# Patient Record
Sex: FEMALE | Race: BLACK OR AFRICAN AMERICAN | NOT HISPANIC OR LATINO | URBAN - METROPOLITAN AREA
[De-identification: names, ages, dates, MRNs, and addresses within clinical notes are randomized per-mention and may not be internally consistent; named-entity substitution may affect disease eponyms.]

---

## 2021-08-31 ENCOUNTER — EMERGENCY (EMERGENCY)
Facility: HOSPITAL | Age: 71
LOS: 1 days | Discharge: HOME | End: 2021-08-31
Attending: EMERGENCY MEDICINE
Payer: MEDICARE

## 2021-08-31 VITALS
DIASTOLIC BLOOD PRESSURE: 75 MMHG | OXYGEN SATURATION: 97 % | HEIGHT: 62 IN | WEIGHT: 293 LBS | RESPIRATION RATE: 18 BRPM | HEART RATE: 75 BPM | TEMPERATURE: 98 F | SYSTOLIC BLOOD PRESSURE: 129 MMHG

## 2021-08-31 LAB
ALBUMIN SERPL ELPH-MCNC: 4.2 G/DL — SIGNIFICANT CHANGE UP (ref 3.3–5.2)
ALP SERPL-CCNC: 144 U/L — HIGH (ref 40–120)
ALT FLD-CCNC: 13 U/L — SIGNIFICANT CHANGE UP
ANION GAP SERPL CALC-SCNC: 14 MMOL/L — SIGNIFICANT CHANGE UP (ref 5–17)
AST SERPL-CCNC: 22 U/L — SIGNIFICANT CHANGE UP
BASOPHILS # BLD AUTO: 0.05 K/UL — SIGNIFICANT CHANGE UP (ref 0–0.2)
BASOPHILS NFR BLD AUTO: 0.7 % — SIGNIFICANT CHANGE UP (ref 0–2)
BILIRUB SERPL-MCNC: <0.2 MG/DL — LOW (ref 0.4–2)
BUN SERPL-MCNC: 25.6 MG/DL — HIGH (ref 8–20)
CALCIUM SERPL-MCNC: 9.9 MG/DL — SIGNIFICANT CHANGE UP (ref 8.6–10.2)
CHLORIDE SERPL-SCNC: 105 MMOL/L — SIGNIFICANT CHANGE UP (ref 98–107)
CO2 SERPL-SCNC: 21 MMOL/L — LOW (ref 22–29)
CREAT SERPL-MCNC: 0.99 MG/DL — SIGNIFICANT CHANGE UP (ref 0.5–1.3)
EOSINOPHIL # BLD AUTO: 0.74 K/UL — HIGH (ref 0–0.5)
EOSINOPHIL NFR BLD AUTO: 10.9 % — HIGH (ref 0–6)
GLUCOSE SERPL-MCNC: 83 MG/DL — SIGNIFICANT CHANGE UP (ref 70–99)
HCT VFR BLD CALC: 44.4 % — SIGNIFICANT CHANGE UP (ref 34.5–45)
HGB BLD-MCNC: 14 G/DL — SIGNIFICANT CHANGE UP (ref 11.5–15.5)
IMM GRANULOCYTES NFR BLD AUTO: 0.1 % — SIGNIFICANT CHANGE UP (ref 0–1.5)
LYMPHOCYTES # BLD AUTO: 2.84 K/UL — SIGNIFICANT CHANGE UP (ref 1–3.3)
LYMPHOCYTES # BLD AUTO: 41.8 % — SIGNIFICANT CHANGE UP (ref 13–44)
MCHC RBC-ENTMCNC: 28.6 PG — SIGNIFICANT CHANGE UP (ref 27–34)
MCHC RBC-ENTMCNC: 31.5 GM/DL — LOW (ref 32–36)
MCV RBC AUTO: 90.8 FL — SIGNIFICANT CHANGE UP (ref 80–100)
MONOCYTES # BLD AUTO: 0.51 K/UL — SIGNIFICANT CHANGE UP (ref 0–0.9)
MONOCYTES NFR BLD AUTO: 7.5 % — SIGNIFICANT CHANGE UP (ref 2–14)
NEUTROPHILS # BLD AUTO: 2.65 K/UL — SIGNIFICANT CHANGE UP (ref 1.8–7.4)
NEUTROPHILS NFR BLD AUTO: 39 % — LOW (ref 43–77)
PLATELET # BLD AUTO: 291 K/UL — SIGNIFICANT CHANGE UP (ref 150–400)
POTASSIUM SERPL-MCNC: 4.7 MMOL/L — SIGNIFICANT CHANGE UP (ref 3.5–5.3)
POTASSIUM SERPL-SCNC: 4.7 MMOL/L — SIGNIFICANT CHANGE UP (ref 3.5–5.3)
PROT SERPL-MCNC: 8.9 G/DL — HIGH (ref 6.6–8.7)
RBC # BLD: 4.89 M/UL — SIGNIFICANT CHANGE UP (ref 3.8–5.2)
RBC # FLD: 15.2 % — HIGH (ref 10.3–14.5)
SODIUM SERPL-SCNC: 140 MMOL/L — SIGNIFICANT CHANGE UP (ref 135–145)
WBC # BLD: 6.8 K/UL — SIGNIFICANT CHANGE UP (ref 3.8–10.5)
WBC # FLD AUTO: 6.8 K/UL — SIGNIFICANT CHANGE UP (ref 3.8–10.5)

## 2021-08-31 PROCEDURE — 73700 CT LOWER EXTREMITY W/O DYE: CPT | Mod: 26,RT,ME

## 2021-08-31 PROCEDURE — G1004: CPT

## 2021-08-31 PROCEDURE — 73562 X-RAY EXAM OF KNEE 3: CPT | Mod: 26,RT

## 2021-08-31 PROCEDURE — 99218: CPT

## 2021-08-31 RX ORDER — LOSARTAN POTASSIUM 100 MG/1
100 TABLET, FILM COATED ORAL DAILY
Refills: 0 | Status: DISCONTINUED | OUTPATIENT
Start: 2021-08-31 | End: 2021-09-05

## 2021-08-31 RX ORDER — OXYCODONE AND ACETAMINOPHEN 5; 325 MG/1; MG/1
1 TABLET ORAL ONCE
Refills: 0 | Status: DISCONTINUED | OUTPATIENT
Start: 2021-08-31 | End: 2021-08-31

## 2021-08-31 RX ORDER — ACETAMINOPHEN 500 MG
650 TABLET ORAL ONCE
Refills: 0 | Status: COMPLETED | OUTPATIENT
Start: 2021-08-31 | End: 2021-08-31

## 2021-08-31 RX ORDER — LORATADINE 10 MG/1
10 TABLET ORAL DAILY
Refills: 0 | Status: DISCONTINUED | OUTPATIENT
Start: 2021-08-31 | End: 2021-09-05

## 2021-08-31 RX ORDER — METOPROLOL TARTRATE 50 MG
100 TABLET ORAL DAILY
Refills: 0 | Status: DISCONTINUED | OUTPATIENT
Start: 2021-08-31 | End: 2021-09-05

## 2021-08-31 RX ORDER — CITALOPRAM 10 MG/1
10 TABLET, FILM COATED ORAL DAILY
Refills: 0 | Status: DISCONTINUED | OUTPATIENT
Start: 2021-08-31 | End: 2021-09-05

## 2021-08-31 RX ORDER — KETOROLAC TROMETHAMINE 30 MG/ML
10 SYRINGE (ML) INJECTION EVERY 6 HOURS
Refills: 0 | Status: COMPLETED | OUTPATIENT
Start: 2021-08-31 | End: 2021-09-05

## 2021-08-31 RX ORDER — ACETAMINOPHEN 500 MG
650 TABLET ORAL EVERY 6 HOURS
Refills: 0 | Status: DISCONTINUED | OUTPATIENT
Start: 2021-08-31 | End: 2021-08-31

## 2021-08-31 RX ORDER — LANOLIN ALCOHOL/MO/W.PET/CERES
5 CREAM (GRAM) TOPICAL AT BEDTIME
Refills: 0 | Status: DISCONTINUED | OUTPATIENT
Start: 2021-08-31 | End: 2021-09-05

## 2021-08-31 RX ORDER — POTASSIUM CHLORIDE 20 MEQ
10 PACKET (EA) ORAL DAILY
Refills: 0 | Status: DISCONTINUED | OUTPATIENT
Start: 2021-08-31 | End: 2021-09-05

## 2021-08-31 RX ORDER — SENNA PLUS 8.6 MG/1
2 TABLET ORAL AT BEDTIME
Refills: 0 | Status: DISCONTINUED | OUTPATIENT
Start: 2021-08-31 | End: 2021-09-05

## 2021-08-31 RX ORDER — OXYCODONE HYDROCHLORIDE 5 MG/1
5 TABLET ORAL EVERY 8 HOURS
Refills: 0 | Status: DISCONTINUED | OUTPATIENT
Start: 2021-08-31 | End: 2021-08-31

## 2021-08-31 RX ORDER — KETOROLAC TROMETHAMINE 30 MG/ML
15 SYRINGE (ML) INJECTION EVERY 8 HOURS
Refills: 0 | Status: DISCONTINUED | OUTPATIENT
Start: 2021-08-31 | End: 2021-08-31

## 2021-08-31 RX ORDER — KETOROLAC TROMETHAMINE 30 MG/ML
30 SYRINGE (ML) INJECTION ONCE
Refills: 0 | Status: DISCONTINUED | OUTPATIENT
Start: 2021-08-31 | End: 2021-08-31

## 2021-08-31 RX ORDER — ACETAMINOPHEN 500 MG
975 TABLET ORAL ONCE
Refills: 0 | Status: COMPLETED | OUTPATIENT
Start: 2021-08-31 | End: 2021-08-31

## 2021-08-31 RX ADMIN — Medication 975 MILLIGRAM(S): at 17:01

## 2021-08-31 RX ADMIN — Medication 30 MILLIGRAM(S): at 17:01

## 2021-08-31 NOTE — ED CDU PROVIDER INITIAL DAY NOTE - MEDICAL DECISION MAKING DETAILS
morbidly obese female with acute on chronic right knee pain atraumatic no secondary signs of infection pain with active and passive ROM. + severe/advanced arthritic degernation of knee. pain limiting ROM and ability to ambulate. placed in observation for PT and pain control. pt open to [facility]

## 2021-08-31 NOTE — ED PROVIDER NOTE - PROGRESS NOTE DETAILS
pty still is in a lot of pain can not walk . place on obs for Am PT and re-eval added the ct of the right knee

## 2021-08-31 NOTE — ED CDU PROVIDER INITIAL DAY NOTE - OBJECTIVE STATEMENT
72 yo female obese PMhx of HTn , depression S.p left knee TKR 2019  G[facility] presents in Er and c.o right knee pain started about 1 week ago . states  she had injection done at orthopedic x 8-9 days ago. pain 10/10 at the medial aspect of the knee worse by walking or bending the right knee w/o radiating . denies any fall or trauma r twisting knee . states she is taking tylenol 650mg and naproxen 500mg  bid without significant relief . last pain med was last night and was tylenol . denies any fever or chills or redness . pt states due to venous stasis she had us venous few month ago and was negative. pt states she use walker to walk at home but due to the pain that she has been experiencing has only been sitting and rolling with the walker

## 2021-08-31 NOTE — ED PROVIDER NOTE - CLINICAL SUMMARY MEDICAL DECISION MAKING FREE TEXT BOX
right knee pain most likely due to OA  S.p left knee TKR . I believed pain is not control well . pt is requesting to f.u ortho at [facility]   toradol 30 IM , percocet 5mg Po . Xray of the right knee . f.u ortho

## 2021-08-31 NOTE — ED PROVIDER NOTE - PHYSICAL EXAMINATION
Const: AOX3 nontoxic appearing, no apparent respiratory or physical distress. use wheelchair obese female   HEENT: NC/AT. Moist mucous membranes.  Eyes: BARBARA. EOMI  Neck: Soft and supple. Full ROM without pain.  Cardiac: Regular rate and regular rhythm.  Resp: Speaking in full sentences. No evidence of respiratory distress.   MSk: right knee: no gross deformity of extremity, active and passive with pain , , (-) effusion, (-) ballottement,  (-) tenderness patella, medial aspect of the knee TTP , negative ligamentous stability difficult to assess secondary to pain on stressing but no gross instability noted. . no erythema or ecchymosis noted   left knee mid line scar noted   Skin: No rashes, abrasions or lacerations.  Lymph: No cervical lymphadenopathy.  Neuro: Awake, alert & oriented x 3. Moves all extremities symmetrically.

## 2021-08-31 NOTE — ED PROVIDER NOTE - OBJECTIVE STATEMENT
72 yo female PMh of HTn , depression on medication S.p left knee TKR 2019 presents in Er and c.o left knee pain started about 1 week ago . states  she had injection done at orthopedic x 8-9 days ago. pain 10/10 at the medial aspect of the knee worse by walking or bending the right knee w.o radiating . denies any fall or trauma r twisting knee . states she is taking tylenol 650mg and naproxen 500mg  bid . states she d.c the naproxen due to was not helping her . last pain med was last night and was tylenol . denies any fever or chills or redness . pt states due to venous stasis she haad us venous few month ago and was negative. pt states she use walker to walk at home

## 2021-08-31 NOTE — ED CDU PROVIDER INITIAL DAY NOTE - PHYSICAL EXAMINATION
Const: AOX3 nontoxic appearing, no apparent respiratory or physical distress. use wheelchair obese female   HEENT: NC/AT. Moist mucous membranes.  Eyes: BARBARA.   Cardiac: Regular rate and regular rhythm.  Resp: Speaking in full sentences. No evidence of respiratory distress.   MSk: right knee: obese.  no gross deformity of extremity, active and passive with pain , no palpable effusion TTP over medial/ anterior patella no overlying erytehma ecchymosis + straight leg raise    left knee mid line scar noted FROM   no calf tenderness 2+ pulses sensation intact   Skin: No rashes, abrasions or lacerations.  Neuro: Awake, alert & oriented x 3.

## 2021-08-31 NOTE — ED PROVIDER NOTE - ATTENDING CONTRIBUTION TO CARE
I, Maria Isabel Domingo, performed a face to face bedside interview with this patient regarding history of present illness, review of symptoms and relevant past medical, social and family history.  I completed an independent physical examination. Medical decision making, follow-up on ordered tests (ie labs, radiologic studies) and re-evaluation of the patient's status has been communicated to the ACP.  Disposition of the patient will be based on test outcome and response to ED interventions.     Pt with atraumatic severe rt knee pain, no fevers. pain worse medial with movemetn and touch. has severe OA. had lubricating injections last week.     morbidly obese   +ttp rt medial joint line with pain with movement     likely arthritis or meniscal injury. xray/pain control. if needed and pain still severe will get ct to r/o tibial plateau fracture. possible PT/Obs if needed

## 2021-08-31 NOTE — ED ADULT NURSE NOTE - OBJECTIVE STATEMENT
Pt with chronic right knee pain that has worsened over the last 4 days. No injury. Pain is worse with weight bearing.

## 2021-09-01 VITALS
DIASTOLIC BLOOD PRESSURE: 75 MMHG | HEART RATE: 62 BPM | TEMPERATURE: 98 F | SYSTOLIC BLOOD PRESSURE: 157 MMHG | OXYGEN SATURATION: 99 % | RESPIRATION RATE: 18 BRPM

## 2021-09-01 LAB — SARS-COV-2 RNA SPEC QL NAA+PROBE: SIGNIFICANT CHANGE UP

## 2021-09-01 PROCEDURE — U0003: CPT

## 2021-09-01 PROCEDURE — 73562 X-RAY EXAM OF KNEE 3: CPT

## 2021-09-01 PROCEDURE — 85025 COMPLETE CBC W/AUTO DIFF WBC: CPT

## 2021-09-01 PROCEDURE — U0005: CPT

## 2021-09-01 PROCEDURE — 80053 COMPREHEN METABOLIC PANEL: CPT

## 2021-09-01 PROCEDURE — G1004: CPT

## 2021-09-01 PROCEDURE — 99283 EMERGENCY DEPT VISIT LOW MDM: CPT | Mod: 25

## 2021-09-01 PROCEDURE — G0378: CPT

## 2021-09-01 PROCEDURE — 99217: CPT

## 2021-09-01 PROCEDURE — 36415 COLL VENOUS BLD VENIPUNCTURE: CPT

## 2021-09-01 PROCEDURE — 73700 CT LOWER EXTREMITY W/O DYE: CPT | Mod: ME

## 2021-09-01 PROCEDURE — 96372 THER/PROPH/DIAG INJ SC/IM: CPT

## 2021-09-01 RX ORDER — OXYCODONE HYDROCHLORIDE 5 MG/1
1 TABLET ORAL
Qty: 9 | Refills: 0
Start: 2021-09-01 | End: 2021-09-03

## 2021-09-01 RX ORDER — ACETAMINOPHEN 500 MG
975 TABLET ORAL ONCE
Refills: 0 | Status: COMPLETED | OUTPATIENT
Start: 2021-09-01 | End: 2021-09-01

## 2021-09-01 RX ADMIN — Medication 650 MILLIGRAM(S): at 00:15

## 2021-09-01 RX ADMIN — LORATADINE 10 MILLIGRAM(S): 10 TABLET ORAL at 11:06

## 2021-09-01 RX ADMIN — Medication 10 MILLIGRAM(S): at 00:15

## 2021-09-01 RX ADMIN — CITALOPRAM 10 MILLIGRAM(S): 10 TABLET, FILM COATED ORAL at 00:15

## 2021-09-01 RX ADMIN — Medication 10 MILLIGRAM(S): at 13:22

## 2021-09-01 RX ADMIN — Medication 10 MILLIGRAM(S): at 00:45

## 2021-09-01 RX ADMIN — Medication 975 MILLIGRAM(S): at 11:06

## 2021-09-01 RX ADMIN — SENNA PLUS 2 TABLET(S): 8.6 TABLET ORAL at 00:14

## 2021-09-01 RX ADMIN — Medication 650 MILLIGRAM(S): at 00:45

## 2021-09-01 RX ADMIN — Medication 5 MILLIGRAM(S): at 00:14

## 2021-09-01 RX ADMIN — Medication 975 MILLIGRAM(S): at 13:22

## 2021-09-01 RX ADMIN — Medication 10 MILLIGRAM(S): at 11:15

## 2021-09-01 RX ADMIN — CITALOPRAM 10 MILLIGRAM(S): 10 TABLET, FILM COATED ORAL at 11:06

## 2021-09-01 RX ADMIN — Medication 100 MILLIGRAM(S): at 05:54

## 2021-09-01 RX ADMIN — Medication 10 MILLIGRAM(S): at 05:54

## 2021-09-01 RX ADMIN — Medication 10 MILLIEQUIVALENT(S): at 11:06

## 2021-09-01 RX ADMIN — LOSARTAN POTASSIUM 100 MILLIGRAM(S): 100 TABLET, FILM COATED ORAL at 00:19

## 2021-09-01 NOTE — ED CDU PROVIDER SUBSEQUENT DAY NOTE - ATTENDING CONTRIBUTION TO CARE
Vivi KERR- 72 Y/O F with h/o left knee replacement p/w worsening rt knee pain. Pt is due to rt knee replacement but pain got worse, no fall or fever. Pt uses rollator at home.     Pt is alert,  obese, well appearing female, sitting comfortably in wheel chair and was able to use restroom with wheel chair help, s1s2 normal reg, b/l clear breath sounds, abd soft, nt, nd, neuro exam aox3, cn 2-12 intact, peerl eomi, skin warm, dry, good turgor    plan to get PT and social work eval, control pain, pt wants new ortho for rt knee replacement, given recommendations and will follow up for surgery

## 2021-09-01 NOTE — ED ADULT NURSE REASSESSMENT NOTE - NS ED NURSE REASSESS COMMENT FT1
Assumed care of patient from previous RN.  Patient A&O in wheel chair.  Patient c/o right knee pain, unable to ambulate due to pain, denies any recent trauma/ injury.  Patient to be seen by obs PA for pending orders.  Patient instructed on call bell for assistance for change from wheel chair to bed, pt verbalized understanding.  PT consult and possible placement.  Safety maintained.
Assumed care of the patient at 0730. Verbal report received from [facility]. Patient A&Ox4. No s/s of acute distres.s States Right knee pain persists. Denies any numbness or tingling. Patient pending PT eval and SW consult. Patient in understanding of plan of care. Patient with no further questions for the RN. Resting in comfort. Call bell within reach and encouraged to use when assistance needed. Will continue to monitor.

## 2021-09-01 NOTE — PROVIDER CONTACT NOTE (OTHER) - BACKGROUND
Pt awaiting a R TKR, scheduled for OCT, pain in R knee becomes debilitating after about 15-20mins. of activity.

## 2021-09-01 NOTE — PHYSICAL THERAPY INITIAL EVALUATION ADULT - ADDITIONAL COMMENTS
36.9 Pt lives with  in a 1 story house with ramp to enter.  able to assist.  PTA, pt able to walk short distance with RW and Modified Independent with all ADLs and self care.

## 2021-09-01 NOTE — ED CDU PROVIDER SUBSEQUENT DAY NOTE - HISTORY
PT placed in OBS, has had no acute incidents or complaints while in CDU PT is stable. PT Placed in OBS for pain control and DC planing for knee pain.

## 2021-09-01 NOTE — ED CDU PROVIDER DISPOSITION NOTE - CARE PROVIDER_API CALL
Gregory Davies (DO)  Orthopedics  [address] [place] [id number]  Phone: [phone]  Fax: [phone]  Follow Up Time:

## 2021-09-01 NOTE — ED CDU PROVIDER DISPOSITION NOTE - PATIENT PORTAL LINK FT
You can access the FollowMyHealth Patient Portal offered by [facility] by registering at the following website: [email or web address]. By joining [employer]’s FollowMyHealth portal, you will also be able to view your health information using other applications (apps) compatible with our system.

## 2021-09-01 NOTE — ED CDU PROVIDER DISPOSITION NOTE - CLINICAL COURSE
71 year old female with PMHx HTN, depression, LKR presents to the ED for R knee pain x 1 week. Denies fall, injury or trauma. Pt lives with her  and uses rolling walker at home. CT knee with osteoarthritis, no fx. CM arranged for home PT and expedited ortho appt for follow up. Will dc with Oxycodone.

## 2021-09-01 NOTE — PROVIDER CONTACT NOTE (OTHER) - ASSESSMENT
Pt with 6/10 c/o right knee pain before and after rx.   Pt is functionally at baseline level of function, and not in need of skilled PT, will no longer follow . Pt left sitting in w/c  in no apparent distress and call bell within reach.

## 2021-09-01 NOTE — ED CDU PROVIDER DISPOSITION NOTE - NSFOLLOWUPINSTRUCTIONS_ED_ALL_ED_FT
Take Tylenol every 6 hours as needed for pain   Take oxycodone 5mg every 8 hours as needed for severe pain   Follow up with primary medical doctor in 2-3 days   Follow up with orthopedist within 1 week   Return to the ED for worsening pain, numbness/ tingling, or any new or concerning symptoms Take Tylenol every 6 hours as needed for pain   Take oxycodone 5mg every 8 hours as needed for severe pain   Follow up with primary medical doctor in 2-3 days   Follow up with orthopedist within 1 week   Use rolling walker to walk  Return to the ED for worsening pain, numbness/ tingling, or any new or concerning symptoms

## 2021-09-01 NOTE — ED CDU PROVIDER DISPOSITION NOTE - ATTENDING CONTRIBUTION TO CARE
lacement but pain got worse, no fall or fever. Pt uses rollator at home.     Pt is alert,  obese, well appearing female, sitting comfortably in wheel chair and was able to use restroom with wheel chair help, s1s2 normal reg, b/l clear breath sounds, abd soft, nt, nd, neuro exam aox3, cn 2-12 intact, peerl eomi, skin warm, dry, good turgor     Sent home, arranged home PT, control pain, pt wants new ortho for rt knee replacement, given recommendations and will follow up for surgery.

## 2021-09-01 NOTE — ED ADULT NURSE REASSESSMENT NOTE - COMFORT CARE
meal provided/plan of care explained/po fluids offered/repositioned/wait time explained
meal provided/plan of care explained/repositioned/wait time explained

## 2021-09-02 PROBLEM — I10 ESSENTIAL (PRIMARY) HYPERTENSION: Chronic | Status: ACTIVE | Noted: 2021-08-31

## 2021-09-10 ENCOUNTER — APPOINTMENT (OUTPATIENT)
Dept: ORTHOPEDIC SURGERY | Facility: CLINIC | Age: 71
End: 2021-09-10
Payer: MEDICARE

## 2021-09-10 DIAGNOSIS — M17.11 UNILATERAL PRIMARY OSTEOARTHRITIS, RIGHT KNEE: ICD-10-CM

## 2021-09-10 DIAGNOSIS — E66.9 OBESITY, UNSPECIFIED: ICD-10-CM

## 2021-09-10 DIAGNOSIS — M25.561 PAIN IN RIGHT KNEE: ICD-10-CM

## 2021-09-10 PROCEDURE — 99204 OFFICE O/P NEW MOD 45 MIN: CPT

## 2021-09-10 NOTE — PHYSICAL EXAM
[de-identified] : GENERAL APPEARANCE: Well nourished and hydrated, pleasant, alert, and oriented x 3. Appears their stated age. \par HEENT: Normocephalic, extraocular eye motion intact. Nasal septum midline. Oral cavity clear. External auditory canal clear. \par RESPIRATORY: Breath sounds clear and audible in all lobes. No wheezing, No accessory muscle use.\par CARDIOVASCULAR: No apparent abnormalities. No lower leg edema. No varicosities. Pedal pulses are palpable.\par NEUROLOGIC: Sensation is normal, no muscle weakness in the upper or lower extremities.\par DERMATOLOGIC: No apparent skin lesions, moist, warm, no rash.\par SPINE: Cervical spine appears normal and moves freely; thoracic spine appears normal and moves freely; lumbosacral spine appears normal and moves freely, normal, nontender.\par MUSCULOSKELETAL: Hands, wrists, and elbows are normal and move freely, shoulders are normal and move freely. \par Psychiatric: Oriented to person, place, and time, insight and judgement were intact and the affect was normal. \par Musculoskeletal:. Right knee exam shows no effusion, ROM is 0-90 degrees, no instability, no pain with Noe, medial joint line tenderness. \par 5/5 motor strength in bilateral lower extremities. Sensory: Intact in bilateral lower extremities. DTRs: Biceps, brachioradialis, triceps, patellar, ankle and plantar 2+ and symmetric bilaterally. Pulses: dorsalis pedis, posterior tibial, femoral, popliteal, and radial 2+ and symmetric bilaterally. \par Constitutional: Alert and in no acute distress, but well-appearing. \par  [de-identified] : X-rays of the right knee brought in from outside on 8/30/2021 show no acute fractures or dislocations.  Severe tricompartmental degenerative arthritis.\par \par CT scan of the right knee from 8/30/2021 shows no acute fractures or dislocations.

## 2021-09-10 NOTE — HISTORY OF PRESENT ILLNESS
[de-identified] : Patient is a 71-year-old female history of left total knee replacement performed in 2019 complicated by a fall and MCL rupture requiring revision surgery presents with a chief complaint of severe right knee pain has been going on intermittently for many years.  Patient states that a few weeks ago she had severe pain in the right knee was had to be seen in the emergency room where CAT scan showed no acute fractures.  She of note is had multiple rounds of gel injections most recently 2 weeks ago as well as cortisone injections of the right knee.  She is changing her care from her current surgeon and finding a new orthopedist.  Denies any overt trauma to the knee.  Does complain of some buckling.  Denies any locking.

## 2021-09-10 NOTE — DISCUSSION/SUMMARY
[Medication Risks Reviewed] : Medication risks reviewed [Surgical risks reviewed] : Surgical risks reviewed [de-identified] : Patient is a 71-year-old female who presents for evaluation of her right knee pain.  She would like to discuss surgical treatment options.  However I did have a lengthy discussion with her and her daughter about the fact I do not think she is an appropriate surgical candidate at this time due to her severe obesity.  She is unable to take NSAIDs due to her medical conditions.  I therefore recommended that she take Voltaren gel and apply it to her knee and have given her prescription.  I have also given her prescription for physical therapy and a nutritionist referral.  We did have a lengthy discussion about weight loss and optimizing for surgery.  I have also given her a referral to bariatric surgery.  I given her a referral to pain management.  She is going to follow-up with me in 4 to 6 weeks.  If no improvement in her symptoms we will consider a steroid injection.  We will discuss a total knee in the future however it is imperative that she loses weight to prevent complications such as blood loss implant failure PE etc.  All questions were asked and answered.

## 2021-09-27 ENCOUNTER — APPOINTMENT (OUTPATIENT)
Dept: SURGERY | Facility: CLINIC | Age: 71
End: 2021-09-27
Payer: MEDICARE

## 2021-09-27 VITALS
SYSTOLIC BLOOD PRESSURE: 175 MMHG | RESPIRATION RATE: 16 BRPM | BODY MASS INDEX: 56.04 KG/M2 | DIASTOLIC BLOOD PRESSURE: 88 MMHG | OXYGEN SATURATION: 96 % | WEIGHT: 293 LBS | HEIGHT: 60.5 IN | HEART RATE: 85 BPM | TEMPERATURE: 97.2 F

## 2021-09-27 VITALS — DIASTOLIC BLOOD PRESSURE: 93 MMHG | SYSTOLIC BLOOD PRESSURE: 156 MMHG

## 2021-09-27 PROCEDURE — 99204 OFFICE O/P NEW MOD 45 MIN: CPT

## 2021-09-28 NOTE — HISTORY OF PRESENT ILLNESS
[de-identified] : 71F with h/o osteoarthritis of the knee, morbid obesity (BMI 60), presents today to discuss her weight loss options. She reports that she needs bilateral knee replacements for OA but that she needs to lose significant weight before she can have surgery. She has limited mobility. She is interested in bariatric surgery as a tool for durable weight loss. She reports that she has tried numerous times to lose weight through more traditional medical means, including caloric restriction, increasing her vegetable intake, and increasing activity.\par Denies GERD.\par No DM\par No tobacco or EtOH use.\par No immunosuppression or chronic NSAID use.

## 2021-09-28 NOTE — ASSESSMENT
[FreeTextEntry1] : 71F with morbid obesity, BMI 60, and osteoarthritis in need of bilateral TKA, here for initial evaluation for bariatric surgery. Based on medical history of weight loss goals, would be a good candidate for sleeve gastrectomy. Will initiate bariatric workup including labs, imaging, and dietiary/pysch/Cards/Pulm/GI evaluations.

## 2021-09-28 NOTE — REVIEW OF SYSTEMS
[Joint Pain] : joint pain [Joint Stiffness] : joint stiffness [Fever] : no fever [Chills] : no chills [Eye Pain] : no eye pain [Red Eyes] : eyes not red [Dysphagia] : no dysphagia [Loss of Hearing] : no loss of hearing [Odynophagia] : no odynophagia [Mucosal Pain] : no mucosal pain [Chest Pain] : no chest pain [Palpitations] : no palpitations [Shortness Of Breath] : no shortness of breath [Wheezing] : no wheezing [Abdominal Pain] : no abdominal pain [Vomiting] : no vomiting [Reflux/Heartburn] : no reflux/ heartburn [Hernia] : no hernia [Dysuria] : no dysuria [Incontinence] : no incontinence [Skin Rash] : no skin rash [Skin Wound] : no skin wound [Confused] : no confusion [Dizziness] : no dizziness [Suicidal] : not suicidal [Insomnia] : no insomnia [Proptosis] : no proptosis [Hot Flashes] : no hot flashes [Easy Bleeding] : no tendency for easy bleeding [Easy Bruising] : no tendency for easy bruising

## 2021-09-28 NOTE — CONSULT LETTER
[Dear  ___] : Dear  [unfilled], [Consult Letter:] : I had the pleasure of evaluating your patient, [unfilled]. [Please see my note below.] : Please see my note below. [Sincerely,] : Sincerely, [Consult Closing:] : Thank you very much for allowing me to participate in the care of this patient.  If you have any questions, please do not hesitate to contact me. [FreeTextEntry1] : She came to our center to discuss the possibility of bariatric surgery to help her lose weight in order to undergo knee replacement. I believe she would be a good candidate for sleeve gastrectomy. She is motivated and I believe she will be highly successful. I will keep you updated as she moves through the preop process. [FreeTextEntry3] : Rene Cazares MD FACS\par Director, Minimally-Invasive Surgery\par [facility]\par [phone]

## 2021-11-02 ENCOUNTER — APPOINTMENT (OUTPATIENT)
Dept: SURGERY | Facility: CLINIC | Age: 71
End: 2021-11-02
Payer: MEDICARE

## 2021-11-02 VITALS
SYSTOLIC BLOOD PRESSURE: 173 MMHG | HEIGHT: 60.5 IN | DIASTOLIC BLOOD PRESSURE: 93 MMHG | WEIGHT: 293 LBS | OXYGEN SATURATION: 97 % | RESPIRATION RATE: 15 BRPM | BODY MASS INDEX: 56.04 KG/M2 | HEART RATE: 85 BPM | TEMPERATURE: 97.8 F

## 2021-11-02 PROCEDURE — 99213 OFFICE O/P EST LOW 20 MIN: CPT

## 2021-11-13 ENCOUNTER — APPOINTMENT (OUTPATIENT)
Dept: DISASTER EMERGENCY | Facility: CLINIC | Age: 71
End: 2021-11-13

## 2021-11-14 ENCOUNTER — APPOINTMENT (OUTPATIENT)
Dept: DISASTER EMERGENCY | Facility: CLINIC | Age: 71
End: 2021-11-14

## 2021-11-14 LAB — SARS-COV-2 N GENE NPH QL NAA+PROBE: NOT DETECTED

## 2021-11-15 ENCOUNTER — TRANSCRIPTION ENCOUNTER (OUTPATIENT)
Age: 71
End: 2021-11-15

## 2021-11-16 ENCOUNTER — OUTPATIENT (OUTPATIENT)
Dept: OUTPATIENT SERVICES | Facility: HOSPITAL | Age: 71
LOS: 1 days | End: 2021-11-16
Payer: MEDICARE

## 2021-11-16 DIAGNOSIS — M25.561 PAIN IN RIGHT KNEE: ICD-10-CM

## 2021-11-16 PROCEDURE — 76000 FLUOROSCOPY <1 HR PHYS/QHP: CPT

## 2021-11-16 PROCEDURE — 20610 DRAIN/INJ JOINT/BURSA W/O US: CPT | Mod: RT

## 2021-11-16 NOTE — PROGRESS NOTE ADULT - SUBJECTIVE AND OBJECTIVE BOX
Indication: Knee Pain, Right - M25.561  Planned Interventional Pain Procedure: right knee genicular nerve block  Interval: single procedure  Anesthesia: local and IV sedation  Estimated Time: 30 minutes  Recovery Facility: [facility] endo suite  Facility Estimated Length of Stay: 0 days.  Indication(s): knee pain  The following anti-inflammatory medications were used for 3 months or more and failed to alleviate  symptoms: anti-inflammatory medication.  The following analgesic medications were used for 3 months or more and failed to alleviate  symptoms: analgesics and Tylenol.  The following treatments were used for 3 months or more and failed to alleviate symptoms: home  exercises, use of cane or walker, and physical therapy.  Verified that patient has no active infection, no systemic bacteremia, no skin infections, no  neuropathic arthritis, no progressive neurological disease, and no other severe medical conditions  that outweigh benefits of procedure.  Guidance Required: Fluoroscopy  OR Equipment Required: C-ARM.

## 2021-11-16 NOTE — BRIEF OPERATIVE NOTE - OPERATION/FINDINGS
In the lateral fluoroscopic view, the right SUPERIOR MEDIAL GENICULAR NERVE was located at the midpoint of the femur at the superior medial epicondyle.  The 25 gauge 3.5 inch needle was advanced slowly and carefully to the epicondyle. Placement was confirmed with multiple fluoroscopic views. Aspiration was negative for blood or other substances. The above was repeated for the right SUPERIOR LATERAL GENICULAR NERVE located at the midpoint of the femur at the superior lateral epicondyle and the right INFERIOR MEDIAL GENICULAR NERVE located at the midpoint of the tibia at the inferior medial epicondyle.   A total of 15cc bupivicaine 0.25% with decadron 10mg was injected in divided doses

## 2021-11-16 NOTE — PROGRESS NOTE ADULT - SUBJECTIVE AND OBJECTIVE BOX
s/p R knee genicular nerve block  endorses 100% pain relief  denies any weakness  was able to ambulate without assistance  clear for discharge  will follow up in office in 2 weeks

## 2021-12-01 ENCOUNTER — APPOINTMENT (OUTPATIENT)
Dept: BARIATRICS/WEIGHT MGMT | Facility: CLINIC | Age: 71
End: 2021-12-01

## 2021-12-07 ENCOUNTER — APPOINTMENT (OUTPATIENT)
Dept: SURGERY | Facility: CLINIC | Age: 71
End: 2021-12-07
Payer: MEDICARE

## 2021-12-07 VITALS
TEMPERATURE: 97.1 F | BODY MASS INDEX: 56.04 KG/M2 | DIASTOLIC BLOOD PRESSURE: 90 MMHG | OXYGEN SATURATION: 97 % | SYSTOLIC BLOOD PRESSURE: 170 MMHG | HEART RATE: 76 BPM | WEIGHT: 293 LBS | HEIGHT: 60.5 IN | RESPIRATION RATE: 16 BRPM

## 2021-12-07 VITALS — SYSTOLIC BLOOD PRESSURE: 161 MMHG | DIASTOLIC BLOOD PRESSURE: 87 MMHG

## 2021-12-07 DIAGNOSIS — R68.82 DECREASED LIBIDO: ICD-10-CM

## 2021-12-07 DIAGNOSIS — Z82.5 FAMILY HISTORY OF ASTHMA AND OTHER CHRONIC LOWER RESPIRATORY DISEASES: ICD-10-CM

## 2021-12-07 DIAGNOSIS — I10 ESSENTIAL (PRIMARY) HYPERTENSION: ICD-10-CM

## 2021-12-07 DIAGNOSIS — Z00.00 ENCOUNTER FOR GENERAL ADULT MEDICAL EXAMINATION W/OUT ABNORMAL FINDINGS: ICD-10-CM

## 2021-12-07 DIAGNOSIS — Z82.49 FAMILY HISTORY OF ISCHEMIC HEART DISEASE AND OTHER DISEASES OF THE CIRCULATORY SYSTEM: ICD-10-CM

## 2021-12-07 DIAGNOSIS — M54.9 DORSALGIA, UNSPECIFIED: ICD-10-CM

## 2021-12-07 DIAGNOSIS — M25.50 PAIN IN UNSPECIFIED JOINT: ICD-10-CM

## 2021-12-07 DIAGNOSIS — F32.A DEPRESSION, UNSPECIFIED: ICD-10-CM

## 2021-12-07 PROCEDURE — 99213 OFFICE O/P EST LOW 20 MIN: CPT

## 2021-12-07 RX ORDER — DICLOFENAC SODIUM 1% 10 MG/G
1 GEL TOPICAL DAILY
Qty: 1 | Refills: 1 | Status: DISCONTINUED | COMMUNITY
Start: 2021-09-10 | End: 2021-12-07

## 2021-12-07 RX ORDER — ACETAMINOPHEN 325 MG/1
TABLET, FILM COATED ORAL
Refills: 0 | Status: ACTIVE | COMMUNITY

## 2021-12-07 RX ORDER — CITALOPRAM 10 MG/1
10 TABLET, FILM COATED ORAL
Refills: 0 | Status: ACTIVE | COMMUNITY

## 2021-12-07 RX ORDER — BIOTIN 10 MG
TABLET ORAL
Refills: 0 | Status: ACTIVE | COMMUNITY

## 2021-12-07 RX ORDER — OLMESARTAN MEDOXOMIL 40 MG/1
40 TABLET, FILM COATED ORAL
Refills: 0 | Status: ACTIVE | COMMUNITY

## 2021-12-07 RX ORDER — LEVOCETIRIZINE DIHYDROCHLORIDE 5 MG/1
5 TABLET ORAL
Refills: 0 | Status: ACTIVE | COMMUNITY

## 2021-12-07 RX ORDER — PNV NO.95/FERROUS FUM/FOLIC AC 28MG-0.8MG
100 TABLET ORAL
Refills: 0 | Status: ACTIVE | COMMUNITY

## 2021-12-07 RX ORDER — NAPROXEN 500 MG/1
500 TABLET ORAL
Refills: 0 | Status: ACTIVE | COMMUNITY

## 2021-12-08 PROBLEM — Z00.00 ENCOUNTER FOR PREVENTIVE HEALTH EXAMINATION: Status: ACTIVE | Noted: 2021-09-02

## 2021-12-08 NOTE — ASSESSMENT
[FreeTextEntry1] : Pt is seeking surgical intervention so the physical restriction of the surgery will allow for early satiety providing a catalyst for lifestyle change to assist with wt loss. Pt seen for 2nd visit of the 6 month medically supervised wt loss program. Pt is a 72 y/o F, whose current wt is 311#, which is above IBW range and BMI is indicative of morbidly obese wt status (BMI: 59.8). Pt presents with wt loss of 4# x 30 days. \par \par Breakfast: skips\par Lunch: skips\par 3pm: Decaf coffee with half & half and stevia, grilled cheese and cabbage soup\par Dinner: Protein, starch, vegetable and cabbage soup on the side\par HS Snack: PB&J on Ritz Crackers\par Fluids: Water and Crystal Light  \par \par Pt is currently on a Spiritism fast for 12 hours per day from 2:30am to 2:30pm. Re-educated the importance of a balanced, healthy diet of nourishing foods and consistent meal pattern for long-term wt loss success and health maintenance. Discussed how fasting is contraindicated post-operatively because it leads to over-eating at the next meal. Pt admits to having some blueberry cobbler due to her friend passing away. Pt re-educated on how concentrated high-sugar content food will be poorly tolerated post-operatively. Pt verbalized understanding and states she cut out Gatorade all together ands is no longer having night time sweets.\par \par Pt reports she has made behavior modifications in regards to emotional eating. Pt reports praying as a way to deal with her emotions and not find comfort in food. Discussed other healthy coping mechanisms and pt verbalized understanding and expressed gratitude.\par \par Exercise: none\par \par Pt with complaints of knee pain. Discussed low-impact seated exercises, which pt declined stating she is getting a "pain ablation" to be able to walk more.\par \par Pt was informed of the importance of surgical preparation and behavior modification. It was explained that weight gain during the pre-operative period may result in insurance and/or surgeon denial. Discussed strategies to assist with behavior modification and wt loss. The pt is confident that she can make the changes necessary to support wt loss success and is motivated. \par \par PRE-OPERATIVE NUTRITION GOALS: \par - Eating more consistent, protein containing meals (i.e. no fasting)\par - Work to improve quality of food choices to maximize nutrition\par - Limit snacks outside physical hunger\par - Work to replace food as a coping strategy and re-direct emotional eating\par \par Will follow up with pt in 1 month. RD to remain available for ongoing support and encouragement.

## 2021-12-08 NOTE — HISTORY OF PRESENT ILLNESS
[de-identified] : Ms. KARAN JOHNSON is a 71 year old with history of morbid obesity, preop for bariatric surgery with Dr. Cazares (date TBD). Here today for NUTRITION VISIT #2. Feels well, denies pain, fever, chills, nausea or vomiting.\par

## 2022-01-11 ENCOUNTER — APPOINTMENT (OUTPATIENT)
Dept: SURGERY | Facility: CLINIC | Age: 72
End: 2022-01-11
Payer: MEDICARE

## 2022-01-11 VITALS
OXYGEN SATURATION: 100 % | BODY MASS INDEX: 56.04 KG/M2 | TEMPERATURE: 97.2 F | DIASTOLIC BLOOD PRESSURE: 73 MMHG | SYSTOLIC BLOOD PRESSURE: 133 MMHG | WEIGHT: 293 LBS | HEART RATE: 87 BPM | HEIGHT: 60.5 IN | RESPIRATION RATE: 16 BRPM

## 2022-01-11 PROCEDURE — 99213 OFFICE O/P EST LOW 20 MIN: CPT

## 2022-01-12 NOTE — HISTORY OF PRESENT ILLNESS
[de-identified] : Ms. KARAN JOHNSON is a 71 year old with history of morbid obesity, preop for bariatric surgery with Dr. Cazares (date TBD). Here today for NUTRITION VISIT #3. Feels well, denies pain, fever, chills, nausea or vomiting.\par

## 2022-01-12 NOTE — ASSESSMENT
[FreeTextEntry1] : Pt is seeking surgical intervention so the physical restriction of the surgery will allow for early satiety providing a catalyst for lifestyle change to assist with wt loss. Pt seen for 3rd visit of the 6 month medically supervised wt loss program. Pt is a 72 y/o F, whose current wt is 309#, which is above IBW range and BMI is indicative of morbidly obese wt status (BMI: 59.38). Pt presents with wt loss of -2# x 30 days.\par \par Breakfast: 1.5 cups of cooked oatmeal with powdered creamer and splenda \par Snack: None\par Lunch: Half a sandwich (bologna or grilled cheese) \par Dinner: Chicken/Shrimp, string beans, and egg noodles/pasta\par Fluids: Tea with powdered creamer and splenda, water\par Alcohol: None\par \par Pt reports habits as large portion sizes and eating past the point of satisfied as obstacles to weight control. Patient admits to eating quickly, skipping meals, and poor food choices. Re-educated the pt on the importance of a balanced, healthy diet of nourishing foods and consistent meal pattern for long-term wt loss success and health maintenance. Pt educated on cutting out powdered creamer, bread, egg noodles, and pasta. Discussed how refined carbohydrates may be poorly tolerated post-operatively. Pt verbalized understanding and states she will cut out those items out. Also discussed at length portion control and satiety cues. Encouraged patient pre-operatively to eat slowly, plan consistent meals with a protein source, and to make food choices based on what will best nourish her body. Pt verbalized understanding. \par \par Exercise: None. Pt with complaints of knee pain. Discussed low-impact exercises. Pt verbalized understanding.  \par \par Pt was informed of the importance of surgical preparation and behavior modification. It was explained that weight gain during the pre-operative period may result in insurance and/or surgeon denial. Discussed strategies to assist with behavior modification and wt loss. The pt is confident that she can make the changes necessary to support wt loss success and is motivated. \par \par PRE-OPERATIVE NUTRITION GOALS: \par -Consume consistent protein containing meals \par -Work to improve quality of food choice to maximize nourishment post-operatively \par -Practice eating techniques necessary for tolerance post-operatively \par -Be mindful of portion control and satiety cues\par \par Will follow up with pt in 1 month. RD to remain available for ongoing support and encouragement.

## 2022-02-08 ENCOUNTER — APPOINTMENT (OUTPATIENT)
Dept: SURGERY | Facility: CLINIC | Age: 72
End: 2022-02-08
Payer: MEDICARE

## 2022-02-08 VITALS
SYSTOLIC BLOOD PRESSURE: 171 MMHG | TEMPERATURE: 97 F | OXYGEN SATURATION: 97 % | DIASTOLIC BLOOD PRESSURE: 75 MMHG | WEIGHT: 293 LBS | BODY MASS INDEX: 56.04 KG/M2 | RESPIRATION RATE: 16 BRPM | HEART RATE: 68 BPM | HEIGHT: 60.5 IN

## 2022-02-08 PROCEDURE — 99213 OFFICE O/P EST LOW 20 MIN: CPT

## 2022-02-09 NOTE — HISTORY OF PRESENT ILLNESS
[de-identified] : Ms. KARAN JOHNSON is a 71 year-old woman with morbid obesity; preop bariatric surgery with Dr. Cazares (date TBD). Here today for NUTRITION VISIT #1. Denies fever, chills, nausea, vomiting. \par

## 2022-02-09 NOTE — HISTORY OF PRESENT ILLNESS
[de-identified] : Ms. KARAN JOHNSON is a 71 year old with history of morbid obesity, preop for bariatric surgery with Dr. Cazares (date TBD). Here today for NUTRITION VISIT #4. Feels well, denies pain, fever, chills, nausea or vomiting.\par

## 2022-02-09 NOTE — ASSESSMENT
[FreeTextEntry1] : Pt is seeking surgical intervention so the physical restriction of the surgery will allow for early satiety providing a catalyst for lifestyle change to assist with wt loss. Pt seen for 4th visit of the 6 month medically supervised wt loss program. Pt is a 71 year y/o F, whose current wt is 305#, which is above IBW range and BMI is indicative of morbidly obese wt status (BMI: 58.6). Pt presents with wt loss of -4#.\par \par Breakfast: 1.5 cups of cooked oatmeal with powdered creamer and splenda \par Snack: None\par Lunch: Grilled cheese with WW bread \par Dinner: Chicken/Shrimp, string beans, and Rice\par Snack: Tangerine or Cookies or Popcorn\par Fluids: Tea with powdered creamer and splenda, 3 16.9oz water bottles, crystal light\par Alcohol: None\par \par Re-enforced the importance of a balanced, healthy diet of nourishing foods and consistent meal pattern for long-term wt loss success and health maintenance. Pt educated on cutting out powdered creamer, bread, rice, desserts, popcorn, and pasta. Discussed how refined carbohydrates may be poorly tolerated post-operatively. Discussed at length portion control and satiety cues. Encouraged patient pre-operatively to eat slowly, plan consistent meals with a protein source, and to make food choices based on what will best nourish her body. Pt was provided with nutrition guidelines pre- and post-operatively, 2-week full liquid diet, vitamin and mineral education, and eating behavior tips. Pt verbalized understanding.\par \par Exercise: Exercise: None. Pt with complaints of knee pain. Discussed low-impact exercises. Pt verbalized understanding. \par \par Pt was informed of the importance of surgical preparation and behavior modification. It was explained that weight gain during the pre-operative period may result in insurance and/or surgeon denial. Discussed strategies to assist with behavior modification and wt loss. The pt is confident that She can make the changes necessary to support wt loss success and is motivated. \par \par PRE-OPERATIVE NUTRITION GOALS: \par -Eliminate calories in beverages\par -Consume consistent protein containing meals \par -Work to improve quality of food choice to maximize nourishment post-operatively \par -Practice eating techniques necessary for tolerance post-operatively \par -Be mindful of portion control and satiety cues\par -Eliminate refined carbohydrates\par -Increase physical activity as tolerated\par \par Will follow up with pt in 1 month. RD to remain available for ongoing support and encouragement.

## 2022-02-09 NOTE — ASSESSMENT
[FreeTextEntry1] : Pt is seeking surgical intervention so the physical restriction of the surgery will allow for early satiety providing a catalyst for lifestyle change to assist with wt loss. Pt seen for 1st visit of the 6 month medically supervised wt loss program. Pt is a 72 y/o F, whose current wt is 315#, which is above IBW range and BMI is indicative of morbidly obese wt status (BMI: 60.6). Pt's wt has been stable x 30 days. \par \par Breakfast: skips\par Lunch: Romanian toast, sausage or eggs with cheese, sausage, toast with decaf coffee (half & half, stevia)\par Snack: PB & crackers\par Dinner: Protein (pork, chicken, turkey, fish), starch, and a vegetable\par Snack: Sweets/Dessert\par Fluids: Water, Crystal Light, and Gatorade\par  \par Discussed the importance of a balanced, healthy diet of nourishing foods and consistent meal pattern for long-term wt loss success and health maintenance. Pt reports she emotionally eats when she is upset or frustrated. Discussed healthy coping mechanisms, such as writing or praying to avoid emotional eating. Pt states she will start journaling to express her emotions. Pt educated on cutting out caloric beverages sabotaging wt loss as caloric fluids provide little satiation and empty calories. Pt verbalized understanding and states she will switch to Gatorade Zero and increase water intake to facilitate adequate hydration. Also discussed consistent protein containing meals and snacks for hunger regulation as well as cutting out starches. Pt verbalized understanding and states she will "remove bread from diet" and refrain from baking for others to avoid having dessert around.  \par \par Exercise: None\par \par Discussed walking for 30 minutes per day with pt. Pt states she has knee pain that prevents her from exercising. Discussed seated exercises but pt declined education.\par \par Pt was informed of the importance of surgical preparation and behavior modification. It was explained that weight gain during the pre-operative period may result in insurance and/or surgeon denial. Discussed strategies to assist with behavior modification and wt loss. The pt is confident that she can make the changes necessary to support wt loss success and is motivated. \par \par PRE-OPERATIVE NUTRITION GOALS:\par - Decreasing starch intake throughout the day\par - Eliminate calories in beverages \par - Consume consistent protein containing meals \par - Work to improve quality of food choices to maximize nutrition\par - Limit snacks outside of physical hunger \par - Work to replace food as a coping strategy and re-direct emotional eating \par \par Will follow up with pt in 1 month. RD to remain available for ongoing support and encouragement.

## 2022-03-01 ENCOUNTER — TRANSCRIPTION ENCOUNTER (OUTPATIENT)
Age: 72
End: 2022-03-01

## 2022-03-02 ENCOUNTER — OUTPATIENT (OUTPATIENT)
Dept: OUTPATIENT SERVICES | Facility: HOSPITAL | Age: 72
LOS: 1 days | End: 2022-03-02
Payer: MEDICARE

## 2022-03-02 DIAGNOSIS — M25.561 PAIN IN RIGHT KNEE: ICD-10-CM

## 2022-03-02 PROCEDURE — 76000 FLUOROSCOPY <1 HR PHYS/QHP: CPT

## 2022-03-02 PROCEDURE — 64454 NJX AA&/STRD GNCLR NRV BRNCH: CPT | Mod: RT

## 2022-03-02 NOTE — BRIEF OPERATIVE NOTE - OPERATION/FINDINGS
In the anterior and lateral fluoroscopic views, the right SUPERIOR MEDIAL GENICULAR NERVE was located at the midpoint of the femur at the superior medial epicondyle.  The 22 gauge 3.5 inch needle was advanced slowly and carefully to the epicondyle. Placement was confirmed with multiple fluoroscopic views. Aspiration was negative for blood or other substances. The above was repeated for the right SUPERIOR LATERAL GENICULAR NERVE located at the midpoint of the femur at the superior lateral epicondyle and the right INFERIOR MEDIAL GENICULAR NERVE located at the midpoint of the tibia at the inferior medial epicondyle.   A total of 8cc bupivicaine 0.25% with decadron 10mg was injected in divided doses

## 2022-03-02 NOTE — PROGRESS NOTE ADULT - SUBJECTIVE AND OBJECTIVE BOX
Indication: Knee Pain, Right - M25.561  Planned Interventional Pain Procedure: right knee genicular nerve block  Interval: single procedure  Anesthesia: local and IV sedation  Estimated Time: 30 minutes  Recovery Facility: [facility] endo suite  Facility Estimated Length of Stay: 0 days.  Indication(s): knee pain  The following anti-inflammatory medications were used for 3 months or more and failed to alleviate  symptoms: anti-inflammatory medication.  The following analgesic medications were used for 3 months or more and failed to alleviate  symptoms: analgesics and Tylenol.  The following treatments were used for 3 months or more and failed to alleviate symptoms: home  exercises, use of cane or walker, and physical therapy.  Verified that patient has no active infection, no systemic bacteremia, no skin infections, no  neuropathic arthritis, no progressive neurological disease, and no other severe medical conditions  that outweigh benefits of procedure.  Guidance Required: Fluoroscopy  OR Equipment Required: C-ARM.    good relief with genicular block #1    here for second nerve block

## 2022-03-02 NOTE — PROGRESS NOTE ADULT - SUBJECTIVE AND OBJECTIVE BOX
s/p R knee genicular nerve block #2  endorses 90% pain relief  denies any weakness  was able to ambulate without assistance  clear for discharge home  will follow up in office

## 2022-04-06 ENCOUNTER — APPOINTMENT (OUTPATIENT)
Dept: PULMONOLOGY | Facility: CLINIC | Age: 72
End: 2022-04-06
Payer: MEDICARE

## 2022-04-06 VITALS
HEART RATE: 78 BPM | RESPIRATION RATE: 16 BRPM | SYSTOLIC BLOOD PRESSURE: 138 MMHG | DIASTOLIC BLOOD PRESSURE: 84 MMHG | OXYGEN SATURATION: 97 %

## 2022-04-06 VITALS — BODY MASS INDEX: 55.32 KG/M2 | HEIGHT: 61 IN | WEIGHT: 293 LBS

## 2022-04-06 DIAGNOSIS — Z87.891 PERSONAL HISTORY OF NICOTINE DEPENDENCE: ICD-10-CM

## 2022-04-06 DIAGNOSIS — R06.83 SNORING: ICD-10-CM

## 2022-04-06 PROCEDURE — 94729 DIFFUSING CAPACITY: CPT

## 2022-04-06 PROCEDURE — 85018 HEMOGLOBIN: CPT | Mod: QW

## 2022-04-06 PROCEDURE — 94010 BREATHING CAPACITY TEST: CPT

## 2022-04-06 PROCEDURE — 94727 GAS DIL/WSHOT DETER LNG VOL: CPT

## 2022-04-06 PROCEDURE — 99204 OFFICE O/P NEW MOD 45 MIN: CPT | Mod: 25

## 2022-04-06 RX ORDER — POTASSIUM CHLORIDE 10 MEQ
10 CAPSULE, EXTENDED RELEASE ORAL
Refills: 0 | Status: DISCONTINUED | COMMUNITY
End: 2022-04-06

## 2022-04-06 NOTE — HISTORY OF PRESENT ILLNESS
[TextBox_4] : Patient is morbidly obese and is preoperative for bariatric surgery. She has a history of congestive heart failure which sounds nonischemic in nature and is being managed by cardiology. She reports occasional dyspnea on exertion but denies coughing or wheezing other than when her heart failure is decompensated. She has been cleared by cardiology. She does not think she snores much. She denies excessive daytime sleepiness. [ESS] : 3 [TextBox_11] : 4

## 2022-04-06 NOTE — CONSULT LETTER
[Dear  ___] : Dear  [unfilled], [Consult Letter:] : I had the pleasure of evaluating your patient, [unfilled]. [Please see my note below.] : Please see my note below. [Consult Closing:] : Thank you very much for allowing me to participate in the care of this patient.  If you have any questions, please do not hesitate to contact me. [Sincerely,] : Sincerely, [FreeTextEntry3] : Yanira Montaño MD FCCP\par D-ABSM\par ABIM board certified in  Pulmonary diseases, Sleep medicine\par Internal medicine\par  [DrAngy  ___] : Dr. MACHUCA

## 2022-04-06 NOTE — PHYSICAL EXAM
[No Acute Distress] : no acute distress [Low Lying Soft Palate] : low lying soft palate [Enlarged Base of the Tongue] : enlarged base of the tongue [III] : Mallampati Class: III [Normal Appearance] : normal appearance [No Neck Mass] : no neck mass [Normal Rate/Rhythm] : normal rate/rhythm [Normal S1, S2] : normal s1, s2 [No Murmurs] : no murmurs [No Resp Distress] : no resp distress [Clear to Auscultation Bilaterally] : clear to auscultation bilaterally [No Abnormalities] : no abnormalities [Benign] : benign [Normal Gait] : normal gait [No Clubbing] : no clubbing [No Cyanosis] : no cyanosis [FROM] : FROM [1+ Pitting] : 1+ pitting [Normal Color/ Pigmentation] : normal color/ pigmentation [No Focal Deficits] : no focal deficits [Oriented x3] : oriented x3 [Normal Affect] : normal affect [TextBox_2] : morbidly obese

## 2022-04-06 NOTE — ASSESSMENT
[FreeTextEntry1] : Patient has normal lung function. She is at risk for obstructive sleep apnea which would be especially important to diagnose and treat in the face of nonischemic cardiomyopathy and certainly to optimize for bariatric surgery. A home sleep study has been ordered and I will see her back after that. Final clearance will be given at that time.

## 2022-04-12 ENCOUNTER — APPOINTMENT (OUTPATIENT)
Dept: SURGERY | Facility: CLINIC | Age: 72
End: 2022-04-12

## 2022-04-26 ENCOUNTER — APPOINTMENT (OUTPATIENT)
Dept: SURGERY | Facility: CLINIC | Age: 72
End: 2022-04-26
Payer: MEDICARE

## 2022-04-26 VITALS
OXYGEN SATURATION: 99 % | SYSTOLIC BLOOD PRESSURE: 154 MMHG | RESPIRATION RATE: 16 BRPM | HEART RATE: 73 BPM | WEIGHT: 293 LBS | TEMPERATURE: 97.6 F | DIASTOLIC BLOOD PRESSURE: 83 MMHG | HEIGHT: 61 IN | BODY MASS INDEX: 55.32 KG/M2

## 2022-04-26 PROCEDURE — 99213 OFFICE O/P EST LOW 20 MIN: CPT

## 2022-04-27 NOTE — ASSESSMENT
[FreeTextEntry1] : Pt is seeking surgical intervention so the physical restriction of the surgery will allow for early satiety providing a catalyst for lifestyle change to assist with wt loss. Pt seen for 5th visit of the 6 month medically supervised wt loss program. Pt is a 71 year y/o F, whose current wt is 298#, which is above IBW range and BMI is indicative of morbidly obese wt status (BMI: 58.6). Pt presents with wt loss of -7# x 60 days.\par \par Breakfast: 1.5 cups of cooked oatmeal with powdered creamer and splenda or PB with a banana\par Snack: None\par Lunch: Pretzels with PB\par Dinner: Fish, onions, peppers, broccoli and tomato sauce\par Snack: animal crackers (portioned out - not every day)\par Fluids: Tea with powdered creamer and splenda, 2 16.9oz water bottles\par Alcohol: None\par \par Re-enforced the importance of a balanced, healthy diet of nourishing foods and consistent meal pattern for long-term wt loss success and health maintenance. Pt educated on cutting out powdered creamer, bread, rice, crackers, popcorn, and pasta. Discussed how refined carbohydrates may be poorly tolerated post-operatively. Discussed at length portion control and satiety cues. Re-enforced eating slowly, planning consistent meals with a protein source, and to make food choices based on what will best nourish her body. Pt educated on consuming 64oz of water per day to facilitate hydration. Pt verbalized understanding and expressed gratitude.\par \par Exercise: None. Pt with complaints of knee pain. Discussed low-impact exercises. Pt verbalized understanding. \par \par Pt was informed of the importance of surgical preparation and behavior modification. It was explained that weight gain during the pre-operative period may result in insurance and/or surgeon denial. Discussed strategies to assist with behavior modification and wt loss. The pt is confident that She can make the changes necessary to support wt loss success and is motivated. \par \par PRE-OPERATIVE NUTRITION GOALS: \par -Increase water intake to facilitate hydration\par -Consume consistent protein containing meals \par -Work to improve quality of food choice to maximize nourishment post-operatively \par -Practice eating techniques necessary for tolerance post-operatively \par -Be mindful of portion control and satiety cues\par -Eliminate refined carbohydrates\par -Increase physical activity as tolerated\par \par Will follow up with pt in 1 month. RD to remain available for ongoing support and encouragement.

## 2022-05-07 ENCOUNTER — APPOINTMENT (OUTPATIENT)
Age: 72
End: 2022-05-07
Payer: MEDICARE

## 2022-05-07 PROCEDURE — ZZZZZ: CPT

## 2022-05-16 ENCOUNTER — APPOINTMENT (OUTPATIENT)
Dept: GASTROENTEROLOGY | Facility: CLINIC | Age: 72
End: 2022-05-16
Payer: MEDICARE

## 2022-05-16 VITALS
BODY MASS INDEX: 55.32 KG/M2 | SYSTOLIC BLOOD PRESSURE: 161 MMHG | HEIGHT: 61 IN | WEIGHT: 293 LBS | HEART RATE: 66 BPM | DIASTOLIC BLOOD PRESSURE: 79 MMHG

## 2022-05-16 PROCEDURE — 99203 OFFICE O/P NEW LOW 30 MIN: CPT

## 2022-05-17 ENCOUNTER — APPOINTMENT (OUTPATIENT)
Age: 72
End: 2022-05-17
Payer: MEDICARE

## 2022-05-17 ENCOUNTER — OUTPATIENT (OUTPATIENT)
Dept: OUTPATIENT SERVICES | Facility: HOSPITAL | Age: 72
LOS: 1 days | End: 2022-05-17
Payer: MEDICARE

## 2022-05-17 DIAGNOSIS — G47.33 OBSTRUCTIVE SLEEP APNEA (ADULT) (PEDIATRIC): ICD-10-CM

## 2022-05-17 PROCEDURE — 95800 SLP STDY UNATTENDED: CPT

## 2022-05-21 NOTE — HISTORY OF PRESENT ILLNESS
[de-identified] : 72yo female with obesity for pre-bariatric surgery endoscopy\par \par She denies gerd or abdominal pain.\par She has obesity and awaiting gastric sleeve

## 2022-05-21 NOTE — ASSESSMENT
[FreeTextEntry1] : 70yo female with obesity needs EGD prior to bariatric surgery planned gastric sleeve\par \par will check egd with biopsies for hpylori, and jiang's if indicated\par procedure to be performed at  due to BMI over 50\par Risks and benefits of procedure(s) discussed with patient in detail, including but not limited to, perforation, bleeding, reaction to anesthesia, missed lesions.\par

## 2022-05-21 NOTE — PHYSICAL EXAM
[FreeTextEntry1] : obese NAD [Auscultation Breath Sounds / Voice Sounds] : lungs were clear to auscultation bilaterally [Heart Rate And Rhythm] : heart rate was normal and rhythm regular [Heart Sounds] : normal S1 and S2 [Heart Sounds Gallop] : no gallops [Murmurs] : no murmurs [Heart Sounds Pericardial Friction Rub] : no pericardial rub [Bowel Sounds] : normal bowel sounds [Abdomen Soft] : soft [Abdomen Tenderness] : non-tender [] : no hepato-splenomegaly [Abdomen Mass (___ Cm)] : no abdominal mass palpated [Abnormal Walk] : normal gait [Nail Clubbing] : no clubbing  or cyanosis of the fingernails [Musculoskeletal - Swelling] : no joint swelling seen [Motor Tone] : muscle strength and tone were normal [Oriented To Time, Place, And Person] : oriented to person, place, and time [Impaired Insight] : insight and judgment were intact [Affect] : the affect was normal

## 2022-05-24 ENCOUNTER — APPOINTMENT (OUTPATIENT)
Dept: SURGERY | Facility: CLINIC | Age: 72
End: 2022-05-24

## 2022-05-24 ENCOUNTER — APPOINTMENT (OUTPATIENT)
Dept: ULTRASOUND IMAGING | Facility: CLINIC | Age: 72
End: 2022-05-24
Payer: MEDICARE

## 2022-05-24 VITALS
HEIGHT: 61 IN | SYSTOLIC BLOOD PRESSURE: 153 MMHG | BODY MASS INDEX: 55.32 KG/M2 | RESPIRATION RATE: 16 BRPM | OXYGEN SATURATION: 96 % | HEART RATE: 62 BPM | TEMPERATURE: 97.3 F | WEIGHT: 293 LBS | DIASTOLIC BLOOD PRESSURE: 87 MMHG

## 2022-05-24 PROCEDURE — 76700 US EXAM ABDOM COMPLETE: CPT

## 2022-05-24 PROCEDURE — 97803 MED NUTRITION INDIV SUBSEQ: CPT

## 2022-05-26 ENCOUNTER — OUTPATIENT (OUTPATIENT)
Dept: OUTPATIENT SERVICES | Facility: HOSPITAL | Age: 72
LOS: 1 days | End: 2022-05-26
Payer: MEDICARE

## 2022-05-26 DIAGNOSIS — R13.10 DYSPHAGIA, UNSPECIFIED: ICD-10-CM

## 2022-05-26 PROCEDURE — 74246 X-RAY XM UPR GI TRC 2CNTRST: CPT | Mod: 26

## 2022-05-26 PROCEDURE — 74246 X-RAY XM UPR GI TRC 2CNTRST: CPT

## 2022-06-01 ENCOUNTER — APPOINTMENT (OUTPATIENT)
Dept: PULMONOLOGY | Facility: CLINIC | Age: 72
End: 2022-06-01
Payer: MEDICARE

## 2022-06-01 VITALS
WEIGHT: 293 LBS | RESPIRATION RATE: 16 BRPM | BODY MASS INDEX: 56.31 KG/M2 | OXYGEN SATURATION: 98 % | HEART RATE: 76 BPM | SYSTOLIC BLOOD PRESSURE: 148 MMHG | DIASTOLIC BLOOD PRESSURE: 80 MMHG

## 2022-06-01 PROCEDURE — 99214 OFFICE O/P EST MOD 30 MIN: CPT

## 2022-06-01 RX ORDER — TOBRAMYCIN AND DEXAMETHASONE 3; 1 MG/ML; MG/ML
0.3-0.1 SUSPENSION/ DROPS OPHTHALMIC
Qty: 5 | Refills: 0 | Status: DISCONTINUED | COMMUNITY
Start: 2022-05-05

## 2022-06-01 RX ORDER — LISINOPRIL 2.5 MG/1
2.5 TABLET ORAL
Qty: 90 | Refills: 0 | Status: ACTIVE | COMMUNITY
Start: 2021-11-11

## 2022-06-01 RX ORDER — METOPROLOL TARTRATE 100 MG/1
100 TABLET, FILM COATED ORAL
Refills: 0 | Status: DISCONTINUED | COMMUNITY
End: 2022-06-01

## 2022-06-01 RX ORDER — DIAZEPAM 5 MG/1
5 TABLET ORAL
Qty: 2 | Refills: 0 | Status: DISCONTINUED | COMMUNITY
Start: 2022-04-18

## 2022-06-01 RX ORDER — METOPROLOL SUCCINATE 100 MG/1
100 TABLET, EXTENDED RELEASE ORAL
Qty: 90 | Refills: 0 | Status: ACTIVE | COMMUNITY
Start: 2022-05-05

## 2022-06-01 NOTE — HISTORY OF PRESENT ILLNESS
[TextBox_4] : The patient came in today to discuss her recent sleep study. Surgery has not yet been scheduled. [Home] : home [TextBox_100] : 5/22 [TextBox_108] : 6 [TextBox_112] : 100 [TextBox_116] : 92 [TextBox_165] : I reviewed the patient's sleep study with the patient.\par  [ESS] : 3

## 2022-06-01 NOTE — ASSESSMENT
[FreeTextEntry1] : The patient has minimal obstructive sleep apnea that does not need to be treated prior to surgery. She has normal lung function. The patient is cleared for bariatric surgery from a pulmonary and sleep medicine point of view. Followup p.r.n.

## 2022-06-03 ENCOUNTER — LABORATORY RESULT (OUTPATIENT)
Age: 72
End: 2022-06-03

## 2022-06-07 ENCOUNTER — RESULT REVIEW (OUTPATIENT)
Age: 72
End: 2022-06-07

## 2022-06-07 ENCOUNTER — OUTPATIENT (OUTPATIENT)
Dept: OUTPATIENT SERVICES | Facility: HOSPITAL | Age: 72
LOS: 1 days | Discharge: HOME | End: 2022-06-07
Payer: MEDICARE

## 2022-06-07 ENCOUNTER — APPOINTMENT (OUTPATIENT)
Dept: GASTROENTEROLOGY | Facility: HOSPITAL | Age: 72
End: 2022-06-07

## 2022-06-07 VITALS
TEMPERATURE: 98 F | DIASTOLIC BLOOD PRESSURE: 82 MMHG | HEART RATE: 62 BPM | RESPIRATION RATE: 18 BRPM | WEIGHT: 293 LBS | SYSTOLIC BLOOD PRESSURE: 163 MMHG | HEIGHT: 61 IN | OXYGEN SATURATION: 100 %

## 2022-06-07 DIAGNOSIS — Z98.890 OTHER SPECIFIED POSTPROCEDURAL STATES: Chronic | ICD-10-CM

## 2022-06-07 PROCEDURE — 88313 SPECIAL STAINS GROUP 2: CPT

## 2022-06-07 PROCEDURE — 88305 TISSUE EXAM BY PATHOLOGIST: CPT | Mod: 26

## 2022-06-07 PROCEDURE — 88312 SPECIAL STAINS GROUP 1: CPT

## 2022-06-07 PROCEDURE — 88342 IMHCHEM/IMCYTCHM 1ST ANTB: CPT | Mod: 26

## 2022-06-07 PROCEDURE — 88313 SPECIAL STAINS GROUP 2: CPT | Mod: 26

## 2022-06-07 PROCEDURE — 43239 EGD BIOPSY SINGLE/MULTIPLE: CPT

## 2022-06-07 PROCEDURE — 88305 TISSUE EXAM BY PATHOLOGIST: CPT

## 2022-06-07 PROCEDURE — 88312 SPECIAL STAINS GROUP 1: CPT | Mod: 26

## 2022-06-07 PROCEDURE — 88342 IMHCHEM/IMCYTCHM 1ST ANTB: CPT

## 2022-06-10 DIAGNOSIS — E66.01 MORBID (SEVERE) OBESITY DUE TO EXCESS CALORIES: ICD-10-CM

## 2022-06-10 DIAGNOSIS — F32.A DEPRESSION, UNSPECIFIED: ICD-10-CM

## 2022-06-10 DIAGNOSIS — B96.81 HELICOBACTER PYLORI [H. PYLORI] AS THE CAUSE OF DISEASES CLASSIFIED ELSEWHERE: ICD-10-CM

## 2022-06-10 DIAGNOSIS — Z96.659 PRESENCE OF UNSPECIFIED ARTIFICIAL KNEE JOINT: ICD-10-CM

## 2022-06-10 DIAGNOSIS — K29.00 ACUTE GASTRITIS WITHOUT BLEEDING: ICD-10-CM

## 2022-06-10 DIAGNOSIS — R06.83 SNORING: ICD-10-CM

## 2022-06-10 DIAGNOSIS — A04.8 OTHER SPECIFIED BACTERIAL INTESTINAL INFECTIONS: ICD-10-CM

## 2022-06-10 DIAGNOSIS — K44.9 DIAPHRAGMATIC HERNIA WITHOUT OBSTRUCTION OR GANGRENE: ICD-10-CM

## 2022-06-10 DIAGNOSIS — I10 ESSENTIAL (PRIMARY) HYPERTENSION: ICD-10-CM

## 2022-06-10 DIAGNOSIS — Z87.891 PERSONAL HISTORY OF NICOTINE DEPENDENCE: ICD-10-CM

## 2022-06-10 DIAGNOSIS — K22.89 OTHER SPECIFIED DISEASE OF ESOPHAGUS: ICD-10-CM

## 2022-06-10 RX ORDER — OMEPRAZOLE 40 MG/1
40 CAPSULE, DELAYED RELEASE ORAL
Qty: 28 | Refills: 0 | Status: ACTIVE | COMMUNITY
Start: 2022-06-10 | End: 1900-01-01

## 2022-06-10 RX ORDER — AMOXICILLIN 500 MG/1
500 CAPSULE ORAL TWICE DAILY
Qty: 56 | Refills: 0 | Status: ACTIVE | COMMUNITY
Start: 2022-06-10 | End: 1900-01-01

## 2022-06-15 RX ORDER — LEVOFLOXACIN 500 MG/1
500 TABLET, FILM COATED ORAL DAILY
Qty: 10 | Refills: 0 | Status: ACTIVE | COMMUNITY
Start: 2022-06-15 | End: 1900-01-01

## 2022-06-15 RX ORDER — CLARITHROMYCIN 500 MG/1
500 TABLET, FILM COATED ORAL TWICE DAILY
Qty: 28 | Refills: 0 | Status: DISCONTINUED | COMMUNITY
Start: 2022-06-10 | End: 2022-06-15

## 2022-06-28 ENCOUNTER — APPOINTMENT (OUTPATIENT)
Dept: BARIATRICS | Facility: CLINIC | Age: 72
End: 2022-06-28

## 2022-06-28 VITALS
DIASTOLIC BLOOD PRESSURE: 85 MMHG | TEMPERATURE: 98.2 F | BODY MASS INDEX: 53.48 KG/M2 | WEIGHT: 283.25 LBS | RESPIRATION RATE: 16 BRPM | HEIGHT: 61 IN | OXYGEN SATURATION: 96 % | HEART RATE: 71 BPM | SYSTOLIC BLOOD PRESSURE: 143 MMHG

## 2022-06-28 DIAGNOSIS — Z71.3 DIETARY COUNSELING AND SURVEILLANCE: ICD-10-CM

## 2022-06-28 PROCEDURE — 97803 MED NUTRITION INDIV SUBSEQ: CPT

## 2022-07-25 ENCOUNTER — NON-APPOINTMENT (OUTPATIENT)
Age: 72
End: 2022-07-25

## 2022-07-25 LAB
25(OH)D3 SERPL-MCNC: 37.1 NG/ML
ALBUMIN SERPL ELPH-MCNC: 4.3 G/DL
ALP BLD-CCNC: 111 U/L
ALT SERPL-CCNC: 12 U/L
ANION GAP SERPL CALC-SCNC: 12 MMOL/L
APTT BLD: 32.1 SEC
AST SERPL-CCNC: 21 U/L
BASOPHILS # BLD AUTO: 0.05 K/UL
BASOPHILS NFR BLD AUTO: 0.8 %
BILIRUB SERPL-MCNC: 0.3 MG/DL
BUN SERPL-MCNC: 28 MG/DL
CALCIUM SERPL-MCNC: 9.9 MG/DL
CALCIUM SERPL-MCNC: 9.9 MG/DL
CHLORIDE SERPL-SCNC: 109 MMOL/L
CHOLEST SERPL-MCNC: 216 MG/DL
CO2 SERPL-SCNC: 21 MMOL/L
CREAT SERPL-MCNC: 1.06 MG/DL
EGFR: 57 ML/MIN/1.73M2
EOSINOPHIL # BLD AUTO: 0.28 K/UL
EOSINOPHIL NFR BLD AUTO: 4.7 %
ESTIMATED AVERAGE GLUCOSE: 117 MG/DL
FOLATE SERPL-MCNC: >20 NG/ML
GLUCOSE SERPL-MCNC: 93 MG/DL
HBA1C MFR BLD HPLC: 5.7 %
HCG SERPL QL: NEGATIVE
HCT VFR BLD CALC: 38.6 %
HDLC SERPL-MCNC: 61 MG/DL
HGB BLD-MCNC: 12.2 G/DL
IMM GRANULOCYTES NFR BLD AUTO: 0.2 %
INR PPP: 0.98 RATIO
IRON SATN MFR SERPL: 27 %
IRON SERPL-MCNC: 70 UG/DL
LDLC SERPL CALC-MCNC: 134 MG/DL
LYMPHOCYTES # BLD AUTO: 2.49 K/UL
LYMPHOCYTES NFR BLD AUTO: 41.7 %
MAN DIFF?: NORMAL
MCHC RBC-ENTMCNC: 29.3 PG
MCHC RBC-ENTMCNC: 31.6 GM/DL
MCV RBC AUTO: 92.6 FL
MONOCYTES # BLD AUTO: 0.54 K/UL
MONOCYTES NFR BLD AUTO: 9 %
NEUTROPHILS # BLD AUTO: 2.6 K/UL
NEUTROPHILS NFR BLD AUTO: 43.6 %
NONHDLC SERPL-MCNC: 156 MG/DL
PAPP-A SERPL-ACNC: 2 MIU/ML
PARATHYROID HORMONE INTACT: 39 PG/ML
PLATELET # BLD AUTO: 265 K/UL
POTASSIUM SERPL-SCNC: 4.8 MMOL/L
PREALB SERPL NEPH-MCNC: 24 MG/DL
PROT SERPL-MCNC: 7.6 G/DL
PT BLD: 11.5 SEC
RBC # BLD: 4.17 M/UL
RBC # FLD: 14.6 %
SODIUM SERPL-SCNC: 142 MMOL/L
TIBC SERPL-MCNC: 263 UG/DL
TRIGL SERPL-MCNC: 107 MG/DL
TSH SERPL-ACNC: 1.84 UIU/ML
UIBC SERPL-MCNC: 193 UG/DL
VIT B12 SERPL-MCNC: >2000 PG/ML
WBC # FLD AUTO: 5.97 K/UL
ZINC SERPL-MCNC: 94 UG/DL

## 2022-07-26 LAB
H PYLORI AB SER-ACNC: 88.2 UNITS
H PYLORI IGA SER-ACNC: 51.3 UNITS
VIT A SERPL-MCNC: 55.5 UG/DL
VIT B1 SERPL-MCNC: 112.7 NMOL/L

## 2022-07-28 LAB
A-TOCOPHEROL VIT E SERPL-MCNC: 20.6 MG/L
BETA+GAMMA TOCOPHEROL SERPL-MCNC: 1.2 MG/L

## 2022-08-10 LAB — MENADIONE SERPL-MCNC: 0.47 NG/ML

## 2022-08-15 PROBLEM — F41.9 ANXIETY DISORDER, UNSPECIFIED: Chronic | Status: ACTIVE | Noted: 2022-06-07

## 2022-08-15 PROBLEM — E66.9 OBESITY, UNSPECIFIED: Chronic | Status: ACTIVE | Noted: 2022-06-07

## 2022-08-22 ENCOUNTER — APPOINTMENT (OUTPATIENT)
Dept: SURGERY | Facility: CLINIC | Age: 72
End: 2022-08-22

## 2022-08-22 VITALS
OXYGEN SATURATION: 95 % | WEIGHT: 293 LBS | TEMPERATURE: 97.3 F | HEART RATE: 69 BPM | HEIGHT: 61 IN | BODY MASS INDEX: 55.32 KG/M2 | DIASTOLIC BLOOD PRESSURE: 75 MMHG | RESPIRATION RATE: 16 BRPM | SYSTOLIC BLOOD PRESSURE: 143 MMHG

## 2022-08-22 PROCEDURE — 99213 OFFICE O/P EST LOW 20 MIN: CPT

## 2022-08-24 ENCOUNTER — OUTPATIENT (OUTPATIENT)
Dept: OUTPATIENT SERVICES | Facility: HOSPITAL | Age: 72
LOS: 1 days | End: 2022-08-24
Payer: MEDICARE

## 2022-08-24 VITALS
DIASTOLIC BLOOD PRESSURE: 82 MMHG | RESPIRATION RATE: 18 BRPM | OXYGEN SATURATION: 97 % | TEMPERATURE: 98 F | WEIGHT: 293 LBS | SYSTOLIC BLOOD PRESSURE: 122 MMHG | HEIGHT: 61 IN | HEART RATE: 62 BPM

## 2022-08-24 DIAGNOSIS — Z98.890 OTHER SPECIFIED POSTPROCEDURAL STATES: Chronic | ICD-10-CM

## 2022-08-24 DIAGNOSIS — E66.9 OBESITY, UNSPECIFIED: ICD-10-CM

## 2022-08-24 DIAGNOSIS — I10 ESSENTIAL (PRIMARY) HYPERTENSION: ICD-10-CM

## 2022-08-24 DIAGNOSIS — Z29.9 ENCOUNTER FOR PROPHYLACTIC MEASURES, UNSPECIFIED: ICD-10-CM

## 2022-08-24 DIAGNOSIS — G47.33 OBSTRUCTIVE SLEEP APNEA (ADULT) (PEDIATRIC): ICD-10-CM

## 2022-08-24 DIAGNOSIS — M19.90 UNSPECIFIED OSTEOARTHRITIS, UNSPECIFIED SITE: ICD-10-CM

## 2022-08-24 DIAGNOSIS — Z01.818 ENCOUNTER FOR OTHER PREPROCEDURAL EXAMINATION: ICD-10-CM

## 2022-08-24 LAB
A1C WITH ESTIMATED AVERAGE GLUCOSE RESULT: 5.3 % — SIGNIFICANT CHANGE UP (ref 4–5.6)
ALBUMIN SERPL ELPH-MCNC: 4.1 G/DL — SIGNIFICANT CHANGE UP (ref 3.3–5.2)
ALP SERPL-CCNC: 118 U/L — SIGNIFICANT CHANGE UP (ref 40–120)
ALT FLD-CCNC: 12 U/L — SIGNIFICANT CHANGE UP
ANION GAP SERPL CALC-SCNC: 14 MMOL/L — SIGNIFICANT CHANGE UP (ref 5–17)
APTT BLD: 31.3 SEC — SIGNIFICANT CHANGE UP (ref 27.5–35.5)
AST SERPL-CCNC: 18 U/L — SIGNIFICANT CHANGE UP
BASOPHILS # BLD AUTO: 0.06 K/UL — SIGNIFICANT CHANGE UP (ref 0–0.2)
BASOPHILS NFR BLD AUTO: 1.2 % — SIGNIFICANT CHANGE UP (ref 0–2)
BILIRUB SERPL-MCNC: 0.3 MG/DL — LOW (ref 0.4–2)
BLD GP AB SCN SERPL QL: SIGNIFICANT CHANGE UP
BUN SERPL-MCNC: 34.6 MG/DL — HIGH (ref 8–20)
CALCIUM SERPL-MCNC: 9.7 MG/DL — SIGNIFICANT CHANGE UP (ref 8.4–10.5)
CHLORIDE SERPL-SCNC: 104 MMOL/L — SIGNIFICANT CHANGE UP (ref 98–107)
CO2 SERPL-SCNC: 21 MMOL/L — LOW (ref 22–29)
CREAT SERPL-MCNC: 0.96 MG/DL — SIGNIFICANT CHANGE UP (ref 0.5–1.3)
EGFR: 64 ML/MIN/1.73M2 — SIGNIFICANT CHANGE UP
EOSINOPHIL # BLD AUTO: 0.32 K/UL — SIGNIFICANT CHANGE UP (ref 0–0.5)
EOSINOPHIL NFR BLD AUTO: 6.2 % — HIGH (ref 0–6)
ESTIMATED AVERAGE GLUCOSE: 105 MG/DL — SIGNIFICANT CHANGE UP (ref 68–114)
GLUCOSE SERPL-MCNC: 87 MG/DL — SIGNIFICANT CHANGE UP (ref 70–99)
HCT VFR BLD CALC: 38.1 % — SIGNIFICANT CHANGE UP (ref 34.5–45)
HGB BLD-MCNC: 12.2 G/DL — SIGNIFICANT CHANGE UP (ref 11.5–15.5)
IMM GRANULOCYTES NFR BLD AUTO: 0.2 % — SIGNIFICANT CHANGE UP (ref 0–1.5)
INR BLD: 1.03 RATIO — SIGNIFICANT CHANGE UP (ref 0.88–1.16)
LYMPHOCYTES # BLD AUTO: 2.25 K/UL — SIGNIFICANT CHANGE UP (ref 1–3.3)
LYMPHOCYTES # BLD AUTO: 43.4 % — SIGNIFICANT CHANGE UP (ref 13–44)
MAGNESIUM SERPL-MCNC: 2.2 MG/DL — SIGNIFICANT CHANGE UP (ref 1.6–2.6)
MCHC RBC-ENTMCNC: 29.7 PG — SIGNIFICANT CHANGE UP (ref 27–34)
MCHC RBC-ENTMCNC: 32 GM/DL — SIGNIFICANT CHANGE UP (ref 32–36)
MCV RBC AUTO: 92.7 FL — SIGNIFICANT CHANGE UP (ref 80–100)
MONOCYTES # BLD AUTO: 0.46 K/UL — SIGNIFICANT CHANGE UP (ref 0–0.9)
MONOCYTES NFR BLD AUTO: 8.9 % — SIGNIFICANT CHANGE UP (ref 2–14)
NEUTROPHILS # BLD AUTO: 2.09 K/UL — SIGNIFICANT CHANGE UP (ref 1.8–7.4)
NEUTROPHILS NFR BLD AUTO: 40.1 % — LOW (ref 43–77)
PHOSPHATE SERPL-MCNC: 3.6 MG/DL — SIGNIFICANT CHANGE UP (ref 2.4–4.7)
PLATELET # BLD AUTO: 267 K/UL — SIGNIFICANT CHANGE UP (ref 150–400)
POTASSIUM SERPL-MCNC: 4.8 MMOL/L — SIGNIFICANT CHANGE UP (ref 3.5–5.3)
POTASSIUM SERPL-SCNC: 4.8 MMOL/L — SIGNIFICANT CHANGE UP (ref 3.5–5.3)
PROT SERPL-MCNC: 7.7 G/DL — SIGNIFICANT CHANGE UP (ref 6.6–8.7)
PROTHROM AB SERPL-ACNC: 12 SEC — SIGNIFICANT CHANGE UP (ref 10.5–13.4)
RBC # BLD: 4.11 M/UL — SIGNIFICANT CHANGE UP (ref 3.8–5.2)
RBC # FLD: 14.6 % — HIGH (ref 10.3–14.5)
SODIUM SERPL-SCNC: 139 MMOL/L — SIGNIFICANT CHANGE UP (ref 135–145)
WBC # BLD: 5.19 K/UL — SIGNIFICANT CHANGE UP (ref 3.8–10.5)
WBC # FLD AUTO: 5.19 K/UL — SIGNIFICANT CHANGE UP (ref 3.8–10.5)

## 2022-08-24 PROCEDURE — 93005 ELECTROCARDIOGRAM TRACING: CPT

## 2022-08-24 PROCEDURE — G0463: CPT

## 2022-08-24 PROCEDURE — 93010 ELECTROCARDIOGRAM REPORT: CPT

## 2022-08-24 RX ORDER — CEFAZOLIN SODIUM 1 G
3000 VIAL (EA) INJECTION ONCE
Refills: 0 | Status: COMPLETED | OUTPATIENT
Start: 2022-09-06 | End: 2022-09-06

## 2022-08-24 RX ORDER — BUPIVACAINE 13.3 MG/ML
20 INJECTION, SUSPENSION, LIPOSOMAL INFILTRATION ONCE
Refills: 0 | Status: DISCONTINUED | OUTPATIENT
Start: 2022-09-06 | End: 2022-09-06

## 2022-08-24 RX ORDER — METOPROLOL TARTRATE 50 MG
0 TABLET ORAL
Qty: 0 | Refills: 0 | DISCHARGE

## 2022-08-24 RX ORDER — METOPROLOL TARTRATE 50 MG
100 TABLET ORAL
Qty: 0 | Refills: 0 | DISCHARGE

## 2022-08-24 NOTE — H&P PST ADULT - HISTORY OF PRESENT ILLNESS
72-year-old female with PMH of sleep apnea, HTN, HDL, OA arrives from home to [facility] with cheif complaint of inability to loose weight. She states she tried conservative methods such as diet and exercise without any success. She denies any pain at this time. She is scheduled for     history of osteoarthritis and morbid obesity presents for preoperative visit prior to planned bariatric surgery. Based on her preoperative bariatric work-up and medical history we have decided on alevocetirizine as the surgical procedure of choice. She has completed all necessary testing and evaluations and is ready to proceed for surgery.     Impression    Cardiac:   Echocardiogram: within normal limits (EF=55-60%)   Stress Test: within normal limits     Pulmonary:   Pulmonary function test: within normal limits   Sleep Study: mild obstructive sleep apnea (AHI=5.8. mild TERESA, pulmonary clearance in chart )     Gastrointestinal:   RUQ Sono: normal   Upper GI Series: normal   EGD: H. Pylori + (stool test NEGATIVE for H.pylori)     Psychiatric: passed     Evaluations:   Bloodwork (labs): normal (KfjM6j=4.7)   pre-operative support group   6 month weight loss plan   Completed nutritional evaluation 72-year-old female with PMH of sleep apnea, HTN, HDL, OA arrives from home to [facility] with cheif complaint of inability to loose weight. She states she tried conservative methods such as diet and exercise without any success. She denies any pain at this time. She is scheduled for       b/l knees issues had right knee, fell in bathroom had MCL replacement, right knee needs to replacment but needs to loose weight first. recommendation     history of osteoarthritis and morbid obesity presents for preoperative visit prior to planned bariatric surgery. Based on her preoperative bariatric work-up and medical history we have decided on alevocetirizine as the surgical procedure of choice. She has completed all necessary testing and evaluations and is ready to proceed for surgery.     Impression    Cardiac:   Echocardiogram: within normal limits (EF=55-60%)   Stress Test: within normal limits     Pulmonary:   Pulmonary function test: within normal limits   Sleep Study: mild obstructive sleep apnea (AHI=5.8. mild TERESA, pulmonary clearance in chart )     Gastrointestinal:   RUQ Sono: normal   Upper GI Series: normal   EGD: H. Pylori + (stool test NEGATIVE for H.pylori)     Psychiatric: passed     Evaluations:   Bloodwork (labs): normal (KvoR5v=2.7)   pre-operative support group   6 month weight loss plan   Completed nutritional evaluation 72-year-old female with PMH of sleep apnea, HTN, HDL, OA arrives from home to [facility] with cheif complaint of inability to loose weight. She states she tried conservative methods such as diet and exercise without any success. She has 8/10 right knee pain that she reports they wont operate on until she looses weight. She was recommended to have this surgery in order to assist in weight loss. She has currently been on a liquid diet for a week and has lost 4 lbs. She is scheduled for Laparoscopic Sleeve Gastrectomy, Possible Hiatal Hernia Repair, Upper Endoscopy with MD Cazares on 9/6/22.  Medical & Cardiac & Pulmonary Clearance Pending.

## 2022-08-24 NOTE — H&P PST ADULT - OTHER CARE PROVIDERS
PMD Luis Armando Brown [phone], Cardiologist MD Blackman [phone], Pulmonology through surgeons office

## 2022-08-24 NOTE — H&P PST ADULT - PROBLEM SELECTOR PLAN 5
Caprini Score 7: High risk, pharmocologic VTE prophylaxis indicated for most patients (in the absence of contraindications)

## 2022-08-24 NOTE — H&P PST ADULT - NSICDXFAMILYHX_GEN_ALL_CORE_FT
FAMILY HISTORY:  Father  Still living? Unknown  Family history of early CAD, Age at diagnosis: Age Unknown    Mother  Still living? Unknown  FH: asthma, Age at diagnosis: Age Unknown    Sibling  Still living? Unknown  FH: stroke, Age at diagnosis: Age Unknown

## 2022-08-24 NOTE — H&P PST ADULT - NSICDXPASTSURGICALHX_GEN_ALL_CORE_FT
PAST SURGICAL HISTORY:  H/O left knee surgery replacement     PAST SURGICAL HISTORY:  H/O left knee surgery replacement    S/P MCL repair 3/9/2019

## 2022-08-24 NOTE — H&P PST ADULT - PROBLEM SELECTOR PLAN 2
Cardiac:   Echocardiogram: within normal limits (EF=55-60%)   Stress Test: within normal limits Cardiac:   Echocardiogram: within normal limits (EF=55-60%)   Stress Test: within normal limits  Controlled on Metoprolol & lisinopril advised to take am of procedure,  Takes olmesartan at night, advised to continue as prescribed.  Takes Triamterene/ Hydrochlorthiazide, advised combo/ diuretics am of procedure.

## 2022-08-24 NOTE — H&P PST ADULT - PROBLEM SELECTOR PLAN 1
She is scheduled for Laparoscopic Sleeve Gastrectomy, Possible Hiatal Hernia Repair, Upper Endoscopy with MD Cazares on 9/6/22.  Medical & Cardiac & Pulmonary Clearance Pending.    Gastrointestinal:   RUQ Sono: normal   Upper GI Series: normal   EGD: H. Pylori + (stool test NEGATIVE for H.pylori)

## 2022-08-24 NOTE — H&P PST ADULT - ASSESSMENT
72-year-old female with PMH of sleep apnea, HTN, HDL, OA arrives from home to [facility] with chief complaint of inability to loose weight. Upon assessment, lungs clear all lobes, heart RRR, abdomen is soft non tender with no distention, +bowel sounds throughout. + right knee tenderness. Unsteady gait, rolling walker for ambulation. Pt instructed to stop vitamins/supplements/herbal medications/ASA/NSAIDS for one week prior to surgery and discuss with PMD. Patient educated on surgical scrub, COVID testing, preadmission instructions, medical clearance and day of procedure medications, verbalizes understanding.     OPIOID RISK TOOL    DANY EACH BOX THAT APPLIES AND ADD TOTALS AT THE END    FAMILY HISTORY OF SUBSTANCE ABUSE                 FEMALE         MALE                                                Alcohol                             [  ]1 pt          [  ]3pts                                               Illegal Durgs                     [  ]2 pts        [  ]3pts                                               Rx Drugs                           [  ]4 pts        [  ]4 pts    PERSONAL HISTORY OF SUBSTANCE ABUSE                                                                                          Alcohol                             [  ]3 pts       [  ]3 pts                                               Illegal Durgs                     [  ]4 pts        [  ]4 pts                                               Rx Drugs                           [  ]5 pts        [  ]5 pts    AGE BETWEEN 16-45 YEARS                                      [  ]1 pt         [  ]1 pt    HISTORY OF PREADOLESCENT   SEXUAL ABUSE                                                             [  ]3 pts        [  ]0pts    PSYCHOLOGICAL DISEASE                     ADD, OCD, Bipolar, Schizophrenia        [  ]2 pts         [  ]2 pts                      Depression                                               [  ]1 pt           [  ]1 pt           SCORING TOTAL   (add numbers and type here)              (*0**)                                     A score of 3 or lower indicated LOW risk for future opiod abuse  A score of 4 to 7 indicated moderate risk for future opiod abuse  A score of 8 or higher indicates a high risk for opiod abuse    CAPRINI SCORE    AGE RELATED RISK FACTORS                                                             [ ] Age 41-60 years                                            (1 Point)  [ ] Age: 61-74 years                                           (2 Points)                 [x ] Age= 75 years                                                (3 Points)             DISEASE RELATED RISK FACTORS                                                       [ ] Edema in the lower extremities                 (1 Point)                     [ ] Varicose veins                                               (1 Point)                                 [ x] BMI > 25 Kg/m2                                            (1 Point)                                  [ ] Serious infection (ie PNA)                            (1 Point)                     [ ] Lung disease ( COPD, Emphysema)            (1 Point)                                                                          [ ] Acute myocardial infarction                         (1 Point)                  [ ] Congestive heart failure (in the previous month)  (1 Point)         [ ] Inflammatory bowel disease                            (1 Point)                  [ ] Central venous access, PICC or Port               (2 points)       (within the last month)                                                                [ ] Stroke (in the previous month)                        (5 Points)    [ ] Previous or present malignancy                       (2 points)                                                                                                                                                         HEMATOLOGY RELATED FACTORS                                                         [ ] Prior episodes of VTE                                     (3 Points)                     [ ] Positive family history for VTE                      (3 Points)                  [ ] Prothrombin 45827 A                                     (3 Points)                     [ ] Factor V Leiden                                                (3 Points)                        [ ] Lupus anticoagulants                                      (3 Points)                                                           [ ] Anticardiolipin antibodies                              (3 Points)                                                       [ ] High homocysteine in the blood                   (3 Points)                                             [ ] Other congenital or acquired thrombophilia      (3 Points)                                                [ ] Heparin induced thrombocytopenia                  (3 Points)                                        MOBILITY RELATED FACTORS  [ ] Bed rest                                                         (1 Point)  [ ] Plaster cast                                                    (2 points)  [ ] Bed bound for more than 72 hours           (2 Points)    GENDER SPECIFIC FACTORS  [ ] Pregnancy or had a baby within the last month   (1 Point)  [ ] Post-partum < 6 weeks                                   (1 Point)  [ ] Hormonal therapy  or oral contraception   (1 Point)  [ ] History of pregnancy complications              (1 point)  [ ] Unexplained or recurrent              (1 Point)    OTHER RISK FACTORS                                           (1 Point)  [ x] BMI >40, smoking, diabetes requiring insulin, chemotherapy  blood transfusions and length of surgery over 2 hours    SURGERY RELATED RISK FACTORS  [ ]  Section within the last month     (1 Point)  [ ] Minor surgery                                                  (1 Point)  [ ] Arthroscopic surgery                                       (2 Points)  [x ] Planned major surgery lasting more            (2 Points)      than 45 minutes     [ ] Elective hip or knee joint replacement       (5 points)       surgery                                                TRAUMA RELATED RISK FACTORS  [ ] Fracture of the hip, pelvis, or leg                       (5 Points)  [ ] Spinal cord injury resulting in paralysis             (5 points)       (in the previous month)    [ ] Paralysis  (less than 1 month)                             (5 Points)  [ ] Multiple Trauma within 1 month                        (5 Points)    Total Score [ 7     ]    Caprini Score 0-2: Low Risk, NO VTE prophylaxis required for most patients, encourage ambulation  Caprini Score 3-6: Moderate Risk , pharmacologic VTE prophylaxis is indicated for most patients (in the absence of contraindications)  Caprini Score Greater than or =7: High risk, pharmocologic VTE prophylaxis indicated for most patients (in the absence of contraindications)

## 2022-08-24 NOTE — H&P PST ADULT - PROBLEM SELECTOR PLAN 4
Pulmonary:   Pulmonary function test: within normal limits   Sleep Study: mild obstructive sleep apnea (AHI=5.8. mild TERESA, pulmonary clearance in chart )

## 2022-08-24 NOTE — H&P PST ADULT - NSICDXPASTMEDICALHX_GEN_ALL_CORE_FT
PAST MEDICAL HISTORY:  Anxiety and depression     HTN (hypertension)     Hyperlipemia     OA (osteoarthritis)     Obesity     Sleep apnea, obstructive

## 2022-09-05 ENCOUNTER — TRANSCRIPTION ENCOUNTER (OUTPATIENT)
Age: 72
End: 2022-09-05

## 2022-09-06 ENCOUNTER — RESULT REVIEW (OUTPATIENT)
Age: 72
End: 2022-09-06

## 2022-09-06 ENCOUNTER — INPATIENT (INPATIENT)
Facility: HOSPITAL | Age: 72
LOS: 0 days | Discharge: HOME | DRG: 621 | End: 2022-09-07
Attending: SURGERY | Admitting: SURGERY
Payer: MEDICARE

## 2022-09-06 ENCOUNTER — APPOINTMENT (OUTPATIENT)
Dept: SURGERY | Facility: HOSPITAL | Age: 72
End: 2022-09-06

## 2022-09-06 ENCOUNTER — TRANSCRIPTION ENCOUNTER (OUTPATIENT)
Age: 72
End: 2022-09-06

## 2022-09-06 VITALS
RESPIRATION RATE: 16 BRPM | TEMPERATURE: 97 F | WEIGHT: 293 LBS | SYSTOLIC BLOOD PRESSURE: 105 MMHG | OXYGEN SATURATION: 100 % | HEART RATE: 53 BPM | DIASTOLIC BLOOD PRESSURE: 60 MMHG | HEIGHT: 61 IN

## 2022-09-06 DIAGNOSIS — E66.01 MORBID (SEVERE) OBESITY DUE TO EXCESS CALORIES: ICD-10-CM

## 2022-09-06 DIAGNOSIS — Z98.890 OTHER SPECIFIED POSTPROCEDURAL STATES: Chronic | ICD-10-CM

## 2022-09-06 LAB
ANION GAP SERPL CALC-SCNC: 15 MMOL/L — SIGNIFICANT CHANGE UP (ref 5–17)
BASOPHILS # BLD AUTO: 0.04 K/UL — SIGNIFICANT CHANGE UP (ref 0–0.2)
BASOPHILS NFR BLD AUTO: 0.5 % — SIGNIFICANT CHANGE UP (ref 0–2)
BUN SERPL-MCNC: 50.4 MG/DL — HIGH (ref 8–20)
CALCIUM SERPL-MCNC: 9.2 MG/DL — SIGNIFICANT CHANGE UP (ref 8.4–10.5)
CHLORIDE SERPL-SCNC: 105 MMOL/L — SIGNIFICANT CHANGE UP (ref 98–107)
CO2 SERPL-SCNC: 15 MMOL/L — LOW (ref 22–29)
CREAT SERPL-MCNC: 1.12 MG/DL — SIGNIFICANT CHANGE UP (ref 0.5–1.3)
EGFR: 53 ML/MIN/1.73M2 — LOW
EOSINOPHIL # BLD AUTO: 0.15 K/UL — SIGNIFICANT CHANGE UP (ref 0–0.5)
EOSINOPHIL NFR BLD AUTO: 1.7 % — SIGNIFICANT CHANGE UP (ref 0–6)
GLUCOSE BLDC GLUCOMTR-MCNC: 129 MG/DL — HIGH (ref 70–99)
GLUCOSE BLDC GLUCOMTR-MCNC: 137 MG/DL — HIGH (ref 70–99)
GLUCOSE BLDC GLUCOMTR-MCNC: 91 MG/DL — SIGNIFICANT CHANGE UP (ref 70–99)
GLUCOSE SERPL-MCNC: 121 MG/DL — HIGH (ref 70–99)
HCT VFR BLD CALC: 36.5 % — SIGNIFICANT CHANGE UP (ref 34.5–45)
HGB BLD-MCNC: 11.7 G/DL — SIGNIFICANT CHANGE UP (ref 11.5–15.5)
IMM GRANULOCYTES NFR BLD AUTO: 0.2 % — SIGNIFICANT CHANGE UP (ref 0–1.5)
LYMPHOCYTES # BLD AUTO: 3.33 K/UL — HIGH (ref 1–3.3)
LYMPHOCYTES # BLD AUTO: 38.4 % — SIGNIFICANT CHANGE UP (ref 13–44)
MCHC RBC-ENTMCNC: 30.8 PG — SIGNIFICANT CHANGE UP (ref 27–34)
MCHC RBC-ENTMCNC: 32.1 GM/DL — SIGNIFICANT CHANGE UP (ref 32–36)
MCV RBC AUTO: 96.1 FL — SIGNIFICANT CHANGE UP (ref 80–100)
MONOCYTES # BLD AUTO: 0.38 K/UL — SIGNIFICANT CHANGE UP (ref 0–0.9)
MONOCYTES NFR BLD AUTO: 4.4 % — SIGNIFICANT CHANGE UP (ref 2–14)
NEUTROPHILS # BLD AUTO: 4.76 K/UL — SIGNIFICANT CHANGE UP (ref 1.8–7.4)
NEUTROPHILS NFR BLD AUTO: 54.8 % — SIGNIFICANT CHANGE UP (ref 43–77)
PLATELET # BLD AUTO: 210 K/UL — SIGNIFICANT CHANGE UP (ref 150–400)
POTASSIUM SERPL-MCNC: 4.7 MMOL/L — SIGNIFICANT CHANGE UP (ref 3.5–5.3)
POTASSIUM SERPL-SCNC: 4.7 MMOL/L — SIGNIFICANT CHANGE UP (ref 3.5–5.3)
RBC # BLD: 3.8 M/UL — SIGNIFICANT CHANGE UP (ref 3.8–5.2)
RBC # FLD: 14.7 % — HIGH (ref 10.3–14.5)
SARS-COV-2 N GENE NPH QL NAA+PROBE: NOT DETECTED
SODIUM SERPL-SCNC: 135 MMOL/L — SIGNIFICANT CHANGE UP (ref 135–145)
WBC # BLD: 8.68 K/UL — SIGNIFICANT CHANGE UP (ref 3.8–10.5)
WBC # FLD AUTO: 8.68 K/UL — SIGNIFICANT CHANGE UP (ref 3.8–10.5)

## 2022-09-06 PROCEDURE — 43775 LAP SLEEVE GASTRECTOMY: CPT

## 2022-09-06 PROCEDURE — 43200 ESOPHAGOSCOPY FLEXIBLE BRUSH: CPT

## 2022-09-06 PROCEDURE — 43281 LAP PARAESOPHAG HERN REPAIR: CPT | Mod: 59

## 2022-09-06 DEVICE — XI STAPLER SUREFORM RELOAD 60 BLUE: Type: IMPLANTABLE DEVICE | Status: FUNCTIONAL

## 2022-09-06 DEVICE — SPONGE HSTAT SURGICEL 2X14": Type: IMPLANTABLE DEVICE | Status: FUNCTIONAL

## 2022-09-06 DEVICE — CLIP APPLIER COVIDIEN ENDOCLIP II 10MM MED/LG: Type: IMPLANTABLE DEVICE | Status: FUNCTIONAL

## 2022-09-06 DEVICE — STAPLER COVIDIEN TRI-STAPLE 60MM BLACK RELOAD: Type: IMPLANTABLE DEVICE | Status: FUNCTIONAL

## 2022-09-06 DEVICE — XI STAPLER SUREFORM RELOAD 60 GREEN: Type: IMPLANTABLE DEVICE | Status: FUNCTIONAL

## 2022-09-06 DEVICE — STAPLER COVIDIEN TRI-STAPLE 60MM PURPLE RELOAD: Type: IMPLANTABLE DEVICE | Status: FUNCTIONAL

## 2022-09-06 RX ORDER — OLMESARTAN MEDOXOMIL 5 MG/1
0 TABLET, FILM COATED ORAL
Qty: 0 | Refills: 0 | DISCHARGE

## 2022-09-06 RX ORDER — LISINOPRIL 2.5 MG/1
2.5 TABLET ORAL DAILY
Refills: 0 | Status: DISCONTINUED | OUTPATIENT
Start: 2022-09-06 | End: 2022-09-07

## 2022-09-06 RX ORDER — ACETAMINOPHEN 500 MG
1000 TABLET ORAL EVERY 6 HOURS
Refills: 0 | Status: COMPLETED | OUTPATIENT
Start: 2022-09-06 | End: 2022-09-06

## 2022-09-06 RX ORDER — CITALOPRAM 10 MG/1
10 TABLET, FILM COATED ORAL DAILY
Refills: 0 | Status: DISCONTINUED | OUTPATIENT
Start: 2022-09-06 | End: 2022-09-07

## 2022-09-06 RX ORDER — ONDANSETRON 8 MG/1
4 TABLET, FILM COATED ORAL EVERY 6 HOURS
Refills: 0 | Status: DISCONTINUED | OUTPATIENT
Start: 2022-09-06 | End: 2022-09-07

## 2022-09-06 RX ORDER — LISINOPRIL 2.5 MG/1
1 TABLET ORAL
Qty: 0 | Refills: 0 | DISCHARGE

## 2022-09-06 RX ORDER — KETOROLAC TROMETHAMINE 30 MG/ML
15 SYRINGE (ML) INJECTION EVERY 6 HOURS
Refills: 0 | Status: DISCONTINUED | OUTPATIENT
Start: 2022-09-06 | End: 2022-09-07

## 2022-09-06 RX ORDER — SODIUM CHLORIDE 9 MG/ML
1000 INJECTION, SOLUTION INTRAVENOUS
Refills: 0 | Status: DISCONTINUED | OUTPATIENT
Start: 2022-09-06 | End: 2022-09-07

## 2022-09-06 RX ORDER — LOSARTAN POTASSIUM 100 MG/1
100 TABLET, FILM COATED ORAL DAILY
Refills: 0 | Status: DISCONTINUED | OUTPATIENT
Start: 2022-09-06 | End: 2022-09-07

## 2022-09-06 RX ORDER — ACETAMINOPHEN 500 MG
975 TABLET ORAL EVERY 8 HOURS
Refills: 0 | Status: DISCONTINUED | OUTPATIENT
Start: 2022-09-07 | End: 2022-09-07

## 2022-09-06 RX ORDER — METOPROLOL TARTRATE 50 MG
50 TABLET ORAL EVERY 12 HOURS
Refills: 0 | Status: DISCONTINUED | OUTPATIENT
Start: 2022-09-06 | End: 2022-09-07

## 2022-09-06 RX ORDER — CEFAZOLIN SODIUM 1 G
3000 VIAL (EA) INJECTION EVERY 8 HOURS
Refills: 0 | Status: COMPLETED | OUTPATIENT
Start: 2022-09-06 | End: 2022-09-07

## 2022-09-06 RX ORDER — ROSUVASTATIN CALCIUM 5 MG/1
1 TABLET ORAL
Qty: 0 | Refills: 0 | DISCHARGE

## 2022-09-06 RX ORDER — PANTOPRAZOLE SODIUM 20 MG/1
40 TABLET, DELAYED RELEASE ORAL EVERY 24 HOURS
Refills: 0 | Status: DISCONTINUED | OUTPATIENT
Start: 2022-09-06 | End: 2022-09-07

## 2022-09-06 RX ORDER — LEVOCETIRIZINE DIHYDROCHLORIDE 0.5 MG/ML
0 SOLUTION ORAL
Qty: 0 | Refills: 0 | DISCHARGE

## 2022-09-06 RX ORDER — HEPARIN SODIUM 5000 [USP'U]/ML
7500 INJECTION INTRAVENOUS; SUBCUTANEOUS ONCE
Refills: 0 | Status: COMPLETED | OUTPATIENT
Start: 2022-09-06 | End: 2022-09-06

## 2022-09-06 RX ORDER — LANOLIN ALCOHOL/MO/W.PET/CERES
10 CREAM (GRAM) TOPICAL AT BEDTIME
Refills: 0 | Status: DISCONTINUED | OUTPATIENT
Start: 2022-09-06 | End: 2022-09-07

## 2022-09-06 RX ORDER — HYOSCYAMINE SULFATE 0.13 MG
0.12 TABLET ORAL EVERY 4 HOURS
Refills: 0 | Status: DISCONTINUED | OUTPATIENT
Start: 2022-09-06 | End: 2022-09-07

## 2022-09-06 RX ORDER — TRIAMTERENE/HYDROCHLOROTHIAZID 75 MG-50MG
1 TABLET ORAL EVERY 24 HOURS
Refills: 0 | Status: DISCONTINUED | OUTPATIENT
Start: 2022-09-06 | End: 2022-09-07

## 2022-09-06 RX ORDER — HEPARIN SODIUM 5000 [USP'U]/ML
7500 INJECTION INTRAVENOUS; SUBCUTANEOUS EVERY 8 HOURS
Refills: 0 | Status: DISCONTINUED | OUTPATIENT
Start: 2022-09-06 | End: 2022-09-07

## 2022-09-06 RX ORDER — DEXMEDETOMIDINE HYDROCHLORIDE IN 0.9% SODIUM CHLORIDE 4 UG/ML
0.2 INJECTION INTRAVENOUS
Qty: 400 | Refills: 0 | Status: DISCONTINUED | OUTPATIENT
Start: 2022-09-06 | End: 2022-09-06

## 2022-09-06 RX ORDER — FENTANYL CITRATE 50 UG/ML
25 INJECTION INTRAVENOUS
Refills: 0 | Status: DISCONTINUED | OUTPATIENT
Start: 2022-09-06 | End: 2022-09-06

## 2022-09-06 RX ORDER — METOPROLOL TARTRATE 50 MG
1 TABLET ORAL
Qty: 0 | Refills: 0 | DISCHARGE

## 2022-09-06 RX ADMIN — Medication 15 MILLIGRAM(S): at 21:36

## 2022-09-06 RX ADMIN — FENTANYL CITRATE 25 MICROGRAM(S): 50 INJECTION INTRAVENOUS at 19:30

## 2022-09-06 RX ADMIN — HEPARIN SODIUM 7500 UNIT(S): 5000 INJECTION INTRAVENOUS; SUBCUTANEOUS at 21:37

## 2022-09-06 RX ADMIN — Medication 200 MILLIGRAM(S): at 23:41

## 2022-09-06 RX ADMIN — FENTANYL CITRATE 25 MICROGRAM(S): 50 INJECTION INTRAVENOUS at 19:40

## 2022-09-06 RX ADMIN — ONDANSETRON 4 MILLIGRAM(S): 8 TABLET, FILM COATED ORAL at 23:23

## 2022-09-06 RX ADMIN — Medication 15 MILLIGRAM(S): at 22:36

## 2022-09-06 RX ADMIN — HEPARIN SODIUM 7500 UNIT(S): 5000 INJECTION INTRAVENOUS; SUBCUTANEOUS at 13:55

## 2022-09-06 RX ADMIN — FENTANYL CITRATE 25 MICROGRAM(S): 50 INJECTION INTRAVENOUS at 20:33

## 2022-09-06 RX ADMIN — Medication 15 MILLIGRAM(S): at 23:49

## 2022-09-06 RX ADMIN — Medication 400 MILLIGRAM(S): at 23:23

## 2022-09-06 RX ADMIN — FENTANYL CITRATE 25 MICROGRAM(S): 50 INJECTION INTRAVENOUS at 20:03

## 2022-09-06 RX ADMIN — SODIUM CHLORIDE 250 MILLILITER(S): 9 INJECTION, SOLUTION INTRAVENOUS at 20:15

## 2022-09-06 RX ADMIN — Medication 15 MILLIGRAM(S): at 23:23

## 2022-09-06 RX ADMIN — Medication 15 MILLIGRAM(S): at 20:37

## 2022-09-06 RX ADMIN — PANTOPRAZOLE SODIUM 40 MILLIGRAM(S): 20 TABLET, DELAYED RELEASE ORAL at 19:32

## 2022-09-06 RX ADMIN — Medication 1000 MILLIGRAM(S): at 23:49

## 2022-09-06 RX ADMIN — Medication 200 MILLIGRAM(S): at 16:30

## 2022-09-06 RX ADMIN — Medication 10 MILLIGRAM(S): at 23:42

## 2022-09-06 RX ADMIN — Medication 0.12 MILLIGRAM(S): at 20:31

## 2022-09-06 RX ADMIN — FENTANYL CITRATE 25 MICROGRAM(S): 50 INJECTION INTRAVENOUS at 20:31

## 2022-09-06 NOTE — BRIEF OPERATIVE NOTE - NSICDXBRIEFPROCEDURE_GEN_ALL_CORE_FT
PROCEDURES:  Laparoscopic sleeve gastrectomy 06-Sep-2022 18:54:26  Ngoc Mcnulty  Laparoscopic hiatal herniorrhaphy 06-Sep-2022 18:55:00  Ngoc Mcnulty  EGD, intraoperative 06-Sep-2022 18:55:08  Ngoc Mcnulty

## 2022-09-06 NOTE — ASU PREOP CHECKLIST - MEDICAL/PEDIATRIC CLEARANCE ON MEDICAL RECORD
pulmonologist, pcp cleared noted on chart, MD Escobar spoke to cardiologist Marquez for clearance, ok to proceed without further workup

## 2022-09-06 NOTE — ASU PREOP CHECKLIST - LOOSE TEETH

## 2022-09-07 ENCOUNTER — TRANSCRIPTION ENCOUNTER (OUTPATIENT)
Age: 72
End: 2022-09-07

## 2022-09-07 VITALS
SYSTOLIC BLOOD PRESSURE: 120 MMHG | RESPIRATION RATE: 18 BRPM | OXYGEN SATURATION: 97 % | HEART RATE: 74 BPM | TEMPERATURE: 98 F | DIASTOLIC BLOOD PRESSURE: 60 MMHG

## 2022-09-07 LAB
BASOPHILS # BLD AUTO: 0.01 K/UL — SIGNIFICANT CHANGE UP (ref 0–0.2)
BASOPHILS NFR BLD AUTO: 0.1 % — SIGNIFICANT CHANGE UP (ref 0–2)
EOSINOPHIL # BLD AUTO: 0 K/UL — SIGNIFICANT CHANGE UP (ref 0–0.5)
EOSINOPHIL NFR BLD AUTO: 0 % — SIGNIFICANT CHANGE UP (ref 0–6)
GLUCOSE BLDC GLUCOMTR-MCNC: 95 MG/DL — SIGNIFICANT CHANGE UP (ref 70–99)
GLUCOSE BLDC GLUCOMTR-MCNC: 99 MG/DL — SIGNIFICANT CHANGE UP (ref 70–99)
HCT VFR BLD CALC: 41.7 % — SIGNIFICANT CHANGE UP (ref 34.5–45)
HGB BLD-MCNC: 13.3 G/DL — SIGNIFICANT CHANGE UP (ref 11.5–15.5)
IMM GRANULOCYTES NFR BLD AUTO: 0.4 % — SIGNIFICANT CHANGE UP (ref 0–1.5)
LYMPHOCYTES # BLD AUTO: 1.21 K/UL — SIGNIFICANT CHANGE UP (ref 1–3.3)
LYMPHOCYTES # BLD AUTO: 7.8 % — LOW (ref 13–44)
MCHC RBC-ENTMCNC: 30.3 PG — SIGNIFICANT CHANGE UP (ref 27–34)
MCHC RBC-ENTMCNC: 31.9 GM/DL — LOW (ref 32–36)
MCV RBC AUTO: 95 FL — SIGNIFICANT CHANGE UP (ref 80–100)
MONOCYTES # BLD AUTO: 0.53 K/UL — SIGNIFICANT CHANGE UP (ref 0–0.9)
MONOCYTES NFR BLD AUTO: 3.4 % — SIGNIFICANT CHANGE UP (ref 2–14)
NEUTROPHILS # BLD AUTO: 13.65 K/UL — HIGH (ref 1.8–7.4)
NEUTROPHILS NFR BLD AUTO: 88.3 % — HIGH (ref 43–77)
PLATELET # BLD AUTO: 249 K/UL — SIGNIFICANT CHANGE UP (ref 150–400)
RBC # BLD: 4.39 M/UL — SIGNIFICANT CHANGE UP (ref 3.8–5.2)
RBC # FLD: 14.6 % — HIGH (ref 10.3–14.5)
WBC # BLD: 15.46 K/UL — HIGH (ref 3.8–10.5)
WBC # FLD AUTO: 15.46 K/UL — HIGH (ref 3.8–10.5)

## 2022-09-07 PROCEDURE — 88342 IMHCHEM/IMCYTCHM 1ST ANTB: CPT

## 2022-09-07 PROCEDURE — 88307 TISSUE EXAM BY PATHOLOGIST: CPT

## 2022-09-07 PROCEDURE — 85025 COMPLETE CBC W/AUTO DIFF WBC: CPT

## 2022-09-07 PROCEDURE — C1889: CPT

## 2022-09-07 PROCEDURE — 80048 BASIC METABOLIC PNL TOTAL CA: CPT

## 2022-09-07 PROCEDURE — 94640 AIRWAY INHALATION TREATMENT: CPT

## 2022-09-07 PROCEDURE — C9399: CPT

## 2022-09-07 PROCEDURE — 36415 COLL VENOUS BLD VENIPUNCTURE: CPT

## 2022-09-07 PROCEDURE — 82962 GLUCOSE BLOOD TEST: CPT

## 2022-09-07 RX ORDER — LOSARTAN POTASSIUM 100 MG/1
1 TABLET, FILM COATED ORAL
Qty: 0 | Refills: 0 | DISCHARGE
Start: 2022-09-07

## 2022-09-07 RX ORDER — HYOSCYAMINE SULFATE 0.13 MG
0.12 TABLET ORAL
Qty: 56 | Refills: 0
Start: 2022-09-07

## 2022-09-07 RX ORDER — IPRATROPIUM/ALBUTEROL SULFATE 18-103MCG
3 AEROSOL WITH ADAPTER (GRAM) INHALATION ONCE
Refills: 0 | Status: COMPLETED | OUTPATIENT
Start: 2022-09-07 | End: 2022-09-07

## 2022-09-07 RX ORDER — ACETAMINOPHEN 500 MG
30 TABLET ORAL
Qty: 150 | Refills: 0
Start: 2022-09-07

## 2022-09-07 RX ORDER — ACETAMINOPHEN 500 MG
1 TABLET ORAL
Qty: 0 | Refills: 0 | DISCHARGE

## 2022-09-07 RX ORDER — OMEPRAZOLE 10 MG/1
1 CAPSULE, DELAYED RELEASE ORAL
Qty: 30 | Refills: 0
Start: 2022-09-07 | End: 2022-10-06

## 2022-09-07 RX ORDER — TRIAMTERENE/HYDROCHLOROTHIAZID 75 MG-50MG
1 TABLET ORAL
Qty: 0 | Refills: 0 | DISCHARGE

## 2022-09-07 RX ORDER — METOPROLOL TARTRATE 50 MG
1 TABLET ORAL
Qty: 0 | Refills: 0 | DISCHARGE

## 2022-09-07 RX ORDER — METOPROLOL TARTRATE 50 MG
1 TABLET ORAL
Qty: 60 | Refills: 0
Start: 2022-09-07 | End: 2022-10-06

## 2022-09-07 RX ORDER — SENNOSIDES/DOCUSATE SODIUM 8.6MG-50MG
2 TABLET ORAL
Qty: 0 | Refills: 0 | DISCHARGE

## 2022-09-07 RX ORDER — ONDANSETRON 8 MG/1
1 TABLET, FILM COATED ORAL
Qty: 56 | Refills: 0
Start: 2022-09-07

## 2022-09-07 RX ORDER — CITALOPRAM 10 MG/1
0 TABLET, FILM COATED ORAL
Qty: 0 | Refills: 0 | DISCHARGE

## 2022-09-07 RX ADMIN — LOSARTAN POTASSIUM 100 MILLIGRAM(S): 100 TABLET, FILM COATED ORAL at 06:06

## 2022-09-07 RX ADMIN — Medication 975 MILLIGRAM(S): at 15:04

## 2022-09-07 RX ADMIN — Medication 975 MILLIGRAM(S): at 06:22

## 2022-09-07 RX ADMIN — Medication 50 MILLIGRAM(S): at 06:06

## 2022-09-07 RX ADMIN — Medication 15 MILLIGRAM(S): at 04:53

## 2022-09-07 RX ADMIN — SODIUM CHLORIDE 125 MILLILITER(S): 9 INJECTION, SOLUTION INTRAVENOUS at 00:24

## 2022-09-07 RX ADMIN — Medication 15 MILLIGRAM(S): at 06:05

## 2022-09-07 RX ADMIN — Medication 3 MILLILITER(S): at 01:58

## 2022-09-07 RX ADMIN — HEPARIN SODIUM 7500 UNIT(S): 5000 INJECTION INTRAVENOUS; SUBCUTANEOUS at 15:03

## 2022-09-07 RX ADMIN — Medication 200 MILLIGRAM(S): at 06:12

## 2022-09-07 RX ADMIN — Medication 1 TABLET(S): at 01:19

## 2022-09-07 RX ADMIN — ONDANSETRON 4 MILLIGRAM(S): 8 TABLET, FILM COATED ORAL at 11:27

## 2022-09-07 RX ADMIN — Medication 15 MILLIGRAM(S): at 11:28

## 2022-09-07 RX ADMIN — HEPARIN SODIUM 7500 UNIT(S): 5000 INJECTION INTRAVENOUS; SUBCUTANEOUS at 06:04

## 2022-09-07 RX ADMIN — ONDANSETRON 4 MILLIGRAM(S): 8 TABLET, FILM COATED ORAL at 06:05

## 2022-09-07 RX ADMIN — CITALOPRAM 10 MILLIGRAM(S): 10 TABLET, FILM COATED ORAL at 12:42

## 2022-09-07 RX ADMIN — Medication 15 MILLIGRAM(S): at 03:53

## 2022-09-07 RX ADMIN — Medication 15 MILLIGRAM(S): at 06:22

## 2022-09-07 RX ADMIN — LISINOPRIL 2.5 MILLIGRAM(S): 2.5 TABLET ORAL at 06:06

## 2022-09-07 RX ADMIN — Medication 975 MILLIGRAM(S): at 06:09

## 2022-09-07 NOTE — PHYSICAL THERAPY INITIAL EVALUATION ADULT - ADDITIONAL COMMENTS
Pt AxOx4 states she lives at home with  with 0STE and 0 steps inside. Pt was independent with use of cane and rollator PTA.

## 2022-09-07 NOTE — DISCHARGE NOTE PROVIDER - CARE PROVIDER_API CALL
Rene Cazares)  Surgery  [address], [address]st [place] [address]  Phone: [phone]  Fax: [phone]  Follow Up Time: 2 weeks

## 2022-09-07 NOTE — PROGRESS NOTE ADULT - SUBJECTIVE AND OBJECTIVE BOX
INTERVAL HPI/OVERNIGHT EVENTS:    Patient evaluated at bedside. Now s/p Lap sleeve gastrectomy, hiatal hernia repair, intraop egd. Patient tolerated procedure well; denies N/V/CP/SOB, no subjective fevers or chills. Pain well controlled. Reports feeling a little "wheezy"    MEDICATIONS  (STANDING):  acetaminophen    Suspension .. 975 milliGRAM(s) Oral every 8 hours  albuterol/ipratropium for Nebulization. 3 milliLiter(s) Nebulizer once  ceFAZolin   IVPB 3000 milliGRAM(s) IV Intermittent every 8 hours  citalopram Solution 10 milliGRAM(s) Oral daily  heparin   Injectable 7500 Unit(s) SubCutaneous every 8 hours  ketorolac   Injectable 15 milliGRAM(s) IV Push every 6 hours  lactated ringers. 1000 milliLiter(s) (125 mL/Hr) IV Continuous <Continuous>  lisinopril 2.5 milliGRAM(s) Oral daily  losartan 100 milliGRAM(s) Oral daily  melatonin Liquid 10 milliGRAM(s) Oral at bedtime  metoprolol tartrate 50 milliGRAM(s) Oral every 12 hours  ondansetron Injectable 4 milliGRAM(s) IV Push every 6 hours  pantoprazole  Injectable 40 milliGRAM(s) IV Push every 24 hours  sodium chloride 0.9% 1000 milliLiter(s) (250 mL/Hr) IV Continuous <Continuous>  triamterene 37.5 mG/hydrochlorothiazide 25 mG Tablet 1 Tablet(s) Oral every 24 hours    MEDICATIONS  (PRN):  hyoscyamine SL 0.125 milliGRAM(s) SubLingual every 4 hours PRN Gastric Spasms  ketorolac   Injectable 15 milliGRAM(s) IV Push every 6 hours PRN breakthrough pain  LORazepam   Injectable 0.5 milliGRAM(s) IV Push once PRN Esophageal Spasm      Vital Signs Last 24 Hrs  T(C): 36.3 (06 Sep 2022 22:52), Max: 36.4 (06 Sep 2022 18:20)  T(F): 97.4 (06 Sep 2022 22:52), Max: 97.5 (06 Sep 2022 18:20)  HR: 87 (06 Sep 2022 22:52) (53 - 87)  BP: 166/98 (07 Sep 2022 01:26) (105/60 - 178/94)  BP(mean): --  RR: 17 (06 Sep 2022 21:18) (15 - 24)  SpO2: 96% (06 Sep 2022 21:18) (96% - 100%)    Parameters below as of 06 Sep 2022 21:18  Patient On (Oxygen Delivery Method): nasal cannula  O2 Flow (L/min): 3      Constitutional: NAD  HEENT: PERRLA, EOMI, no drainage or redness  Respiratory: respirations even, unlabored on 3L NC; shallow breaths;   Cardiovascular: Regular rate & rhythm  Gastrointestinal: Soft, non-tender, non-distended; no rebound or guarding  Extremities: No peripheral edema, No cyanosis, clubbing   Vascular: Equal and normal pulses: 2+ peripheral pulses throughout  Neurological: A&O x 3; no focal deficits  Psychiatric: Normal mood, normal affect  Musculoskeletal: No joint pain, swelling or deformity; no limitation of movement  Skin: No rashes      I&O's Detail    06 Sep 2022 07:01  -  07 Sep 2022 01:34  --------------------------------------------------------  IN:    Lactated Ringers: 375 mL    Oral Fluid: 60 mL  Total IN: 435 mL    OUT:  Total OUT: 0 mL    Total NET: 435 mL          LABS:                        11.7   8.68  )-----------( 210      ( 06 Sep 2022 18:30 )             36.5     09-06    135  |  105  |  50.4<H>  ----------------------------<  121<H>  4.7   |  15.0<L>  |  1.12    Ca    9.2      06 Sep 2022 18:30            RADIOLOGY & ADDITIONAL STUDIES:    Assessment and plan:  72-year-old female with PMH of sleep apnea, HTN, HDL, OA now s/p lap sleeve gastrectomy, hiatal hernia repair, and intra-op EGD. Recovering well.    - NPO, IVF - ice chips  - f/u diet in AM, ADAT  - PRN pain control per protocol  - wean O2 as tolerated, monitor respiratory status  - encourage IS/pulm toilet  - OOB/ambulate as tolerated  - PRN antiemetics

## 2022-09-07 NOTE — DISCHARGE NOTE NURSING/CASE MANAGEMENT/SOCIAL WORK - NSDCPEFALRISK_GEN_ALL_CORE
For information on Fall & Injury Prevention, visit: [email or web address] OR  [email or web address] OR  https://www.cdc.gov/steadi/patient.html

## 2022-09-07 NOTE — DISCHARGE NOTE NURSING/CASE MANAGEMENT/SOCIAL WORK - PATIENT PORTAL LINK FT
You can access the FollowMyHealth Patient Portal offered by [facility] by registering at the following website: [email or web address]. By joining [facility]’s FollowMyHealth portal, you will also be able to view your health information using other applications (apps) compatible with our system.

## 2022-09-07 NOTE — DISCHARGE NOTE PROVIDER - NSDCMRMEDTOKEN_GEN_ALL_CORE_FT
acetaminophen 160 mg/5 mL oral suspension: 30 milliliter(s) orally once a day   biotin:   CeleXA 10 mg oral tablet:   hyoscyamine 0.125 mg sublingual tablet: 0.125 tab(s) sublingually every 6 hours, As Needed for gastric spasm  levocetirizine 5 mg oral tablet: 1 tab(s) orally once a day (in the evening)  lisinopril 2.5 mg oral tablet: 1 tab(s) orally once a day  Lopressor 50 mg oral tablet: 1 tab(s) orally every 12 hours  losartan 100 mg oral tablet: 1 tab(s) orally once a day  melatonin 10 mg oral capsule: 1 cap(s) orally once a day (at bedtime)  olmesartan 40 mg oral tablet: 1 tab(s) orally once a day  omeprazole 40 mg oral delayed release capsule: 1 opened cap(s) orally once a day   ondansetron 4 mg oral tablet, disintegratin tab(s) orally every 6 hours, As Needed -for nausea   triamterene-hydrochlorothiazide 37.5 mg-25 mg oral capsule: 1 cap(s) orally once a day  Vitamin B-12:

## 2022-09-07 NOTE — DISCHARGE NOTE PROVIDER - NSDCFUSCHEDAPPT_GEN_ALL_CORE_FT
Rene Cazares [facility]  GENRTINY [address]  Scheduled Appointment: 09/19/2022    Rene Cazares [facility]  GENELVIS [address]  Scheduled Appointment: 10/03/2022

## 2022-09-07 NOTE — PHARMACOTHERAPY INTERVENTION NOTE - COMMENTS
Vertical sleeve gastrectomy scheduled for 9/6/2022.  Patient medication reconciliation completed. Patient currently taking:     Home Medications:  CeleXA 10 mg oral tablet: 1 tab orally once a day   levocetirizine 5 mg oral tablet: 1 tab(s) orally once a day (in the evening)  lisinopril 2.5 mg oral tablet: 1 tab(s) orally once a day   melatonin 10 mg oral capsule: 1 cap(s) orally once a day (at bedtime)   olmesartan 40 mg oral tablet: 1 tab(s) orally once a day  metoprolol succinate ER 100mg tablet: 1 tab orally daily  Triamterene/HCTZ 37.5/25mg tablet: 1 tab orally daily    Patient was instructed to use crushed, dissolvable, chewable, or liquid formulations of medications for 1 month. Patient was informed to take daily multivitamins post surgically. Patient reeducated on NSAID avoidance (ibuprofen, ASA, naproxen, aleve) as they increased risk of GI bleeding; may use APAP for mild pain otherwise contact prescriber for consult. Patient was informed on indications and directions for administration for hyoscyamine SL, acetaminophen liquid, ondansetron ODT, and omeprazole DRAngy Patient was instructed to take the medications as follows:     Crush lisinopril, olmesartan   Crush levocetirizine  Crush citalopram  Crush crestor  Change metoprolol ER to tartrate 50mg tablet - 1 tab orally twice daily (confirmed with Dr. Brown)  Discontinue triamterene/HCTZ per Dr. Brown - patient to follow up with Dr. Brown in 2 weeks Vertical sleeve gastrectomy scheduled for 9/6/2022.  Patient medication reconciliation completed. Patient currently taking:     Home Medications:  CeleXA 10 mg oral tablet: 1 tab orally once a day   levocetirizine 5 mg oral tablet: 1 tab(s) orally once a day (in the evening)  lisinopril 2.5 mg oral tablet: 1 tab(s) orally once a day   melatonin 10 mg oral capsule: 1 cap(s) orally once a day (at bedtime)   olmesartan 40 mg oral tablet: 1 tab(s) orally once a day  metoprolol succinate ER 100mg tablet: 1 tab orally daily  Triamterene/HCTZ 37.5/25mg tablet: 1 tab orally daily    Patient was instructed to use crushed, dissolvable, chewable, or liquid formulations of medications for 1 month. Patient was informed to take daily multivitamins post surgically. Patient reeducated on NSAID avoidance (ibuprofen, ASA, naproxen, aleve) as they increased risk of GI bleeding; may use APAP for mild pain otherwise contact prescriber for consult. Patient was informed on indications and directions for administration for hyoscyamine SL, acetaminophen liquid, ondansetron ODT, and omeprazole DRAngy Patient was instructed to take the medications as follows:     Crush lisinopril, olmesartan   Crush levocetirizine  Crush citalopram  Crush crestor  Change metoprolol ER to tartrate 50mg tablet - 1 tab orally twice daily (confirmed with Dr. Brown)  Hold triamterene/HCTZ per Dr. Brown - patient to follow up with Dr. Brown in 2 weeks    Informed patient of new prescriptions sent to pharmacy.  Vertical sleeve gastrectomy scheduled for 9/6/2022.  Patient medication reconciliation completed. Patient currently taking:     Home Medications:  CeleXA 10 mg oral tablet: 1 tab orally once a day   levocetirizine 5 mg oral tablet: 1 tab(s) orally once a day (in the evening)  lisinopril 2.5 mg oral tablet: 1 tab(s) orally once a day   melatonin 10 mg oral capsule: 1 cap(s) orally once a day (at bedtime)   olmesartan 40 mg oral tablet: 1 tab(s) orally once a day  metoprolol succinate ER 100mg tablet: 1 tab orally daily  Triamterene/HCTZ 37.5/25mg tablet: 1 tab orally daily    Patient was instructed to use crushed, dissolvable, chewable, or liquid formulations of medications for 1 month. Patient was informed to take daily multivitamins post surgically. Patient reeducated on NSAID avoidance (ibuprofen, ASA, naproxen, aleve) as they increased risk of GI bleeding; may use APAP for mild pain otherwise contact prescriber for consult. Patient was informed on indications and directions for administration for hyoscyamine SL, acetaminophen liquid, ondansetron ODT, and omeprazole DR. Patient was instructed to take the medications as follows:     Crush lisinopril, olmesartan - Patient informed to monitor blood pressure while on lisinopril, olmesartan, and metoprolol as BP is expected to decrease after surgery.   Crush levocetirizine  Crush citalopram  Crush crestor  Change metoprolol ER to tartrate 50mg tablet - 1 tab orally twice daily (confirmed with Dr. Brown)  Hold triamterene/HCTZ per Dr. Brown - patient to follow up with Dr. Brown in 2 weeks    Informed patient of new prescriptions sent to pharmacy.

## 2022-09-07 NOTE — DISCHARGE NOTE PROVIDER - NSDCCPCAREPLAN_GEN_ALL_CORE_FT
PRINCIPAL DISCHARGE DIAGNOSIS  Diagnosis: Morbid obesity  Assessment and Plan of Treatment: BATHING: Please do not submerge wound underwater. You may shower starting post op day #2. Remove outer clean dressings on post op day #5. Leave steri strips in place. They may fall off on their own.   ACTIVITY: No heavy lifting or straining. Otherwise, you may return to your usual level of physical activity. If you are taking narcotic pain medication (such as Percocet) DO NOT drive a car, operate machinery or make important decisions.  DIET: Please follow Bariatric diet protocol. You may begin your full liquids when returning home. Encourage protein filled liquids.     RETURN TO THE EMERGENCY DEPARTMENT for any of the following - worsening pain, fever/chills, nausea/vomiting, altered mental status, chest pain, shortness of breath, or any other new / worsening symptom. to your usual diet.  NOTIFY YOUR SURGEON IF: You have any bleeding that does not stop, any pus draining from your wound(s), any fever (over 100.4 F) or chills, persistent nausea/vomiting, persistent diarrhea, or if your pain is not controlled on your discharge medications.  FOLLOW-UP: Please follow up with your primary care physician within 3-4weeks after surgery and your bariatric surgeon as discussed. also ensure follow up and continued communication with your dietary team and other support services.

## 2022-09-07 NOTE — CHART NOTE - NSCHARTNOTEFT_GEN_A_CORE
Bariatric Nutrition Note:    Diet: Diet, Clear Liquid:   Bariatric Clear Liquid (BARICLLIQ) (09-07-22 @ 06:20)    PO intake/tolerance: Pt reports tolerating clears well, denies N/V/D.     Education: Provided pt with verbal/written literature on bariatric clear liquid diet (POD 1-2) and bariatric full liquid diet (POD 3-14). Pt demonstrates good understanding. Pt to follow up with outpatient RD after discharge.

## 2022-09-07 NOTE — DISCHARGE NOTE PROVIDER - HOSPITAL COURSE
On 9/6/22 Jeanie Rasheed who has a history of HTN, HLD, OA, TERESA, anxiety, depression and morbid obesity BMI 56.7, underwent Laparoscopic Sleeve Gastrectomy and repair of hiatal hernia. Bariatric ERAS protocol followed to include preoperative and perioperative use of DVT and SSI prophylaxis as well as multi-modal non-opioid analgesia. Patient tolerated the procedure well extubated in the operating room then transferred to the PACU in stable condition. Once hemodynamically stable and effective pain control the patient was able to ambulate with assistance. Pt was also able to void within 4 hours following the procedure. The patient was later transferred to a bariatric unit and placed on bedside continuous pulse oximetry. Pain management included IV multi-modal non-opioid analgesia then transitioned to liquid as needed. The patient tolerated bariatric clear liquid the evening of surgery.    On POD #1 patient remained stable with no acute events overnight, has effective pain control. Denies nausea and vomiting. Patient is ambulating independently and voiding as expected. The rest of the hospital course was uneventful, patient met 8-Point Bariatric Surgery D/C criteria and subsequently cleared for discharge home on POD#1. No extended VTE prophylaxis is necessary ([facility] VTE Risk Stratification Risk <1%). The patient will follow a protocol-derived staged meal progression supervised by the dietitian in the outpatient setting. Patient will follow-up with Dr. Cazares in 10-14 days, medical doctor and dietitian in 30 days. [facility] prior to [facility]. Written discharge instruction explained and given to include VTE prevention.

## 2022-09-13 LAB — SURGICAL PATHOLOGY STUDY: SIGNIFICANT CHANGE UP

## 2022-09-19 ENCOUNTER — APPOINTMENT (OUTPATIENT)
Dept: SURGERY | Facility: CLINIC | Age: 72
End: 2022-09-19

## 2022-09-19 VITALS
BODY MASS INDEX: 53.9 KG/M2 | SYSTOLIC BLOOD PRESSURE: 145 MMHG | HEIGHT: 61 IN | DIASTOLIC BLOOD PRESSURE: 82 MMHG | HEART RATE: 100 BPM | RESPIRATION RATE: 16 BRPM | OXYGEN SATURATION: 95 % | TEMPERATURE: 96.5 F | WEIGHT: 285.5 LBS

## 2022-09-19 PROCEDURE — 99024 POSTOP FOLLOW-UP VISIT: CPT

## 2022-09-19 NOTE — ASSESSMENT
[___ Days Post Op] : [unfilled] days [Today's Weight: ___] : Today's weight:[unfilled] lbs [Today's BMI: ___] : Today's BMI: [unfilled] [de-identified] : Doing well s/p lap sleeve gastrectomy, hiatal hernia repair.

## 2022-09-19 NOTE — HISTORY OF PRESENT ILLNESS
[Procedure: ___] : Procedure performed: [unfilled]  [Date of Surgery: ___] : Date of Surgery:   [unfilled] [Surgeon Name:   ___] : Surgeon Name: Dr. MACHUCA [Pre-Op Weight ___] : Pre-op weight was [unfilled] lbs [___ Days Post Op] : [unfilled] days  [Phase 1] : Phase 1 [Walking] : walking

## 2022-09-26 ENCOUNTER — NON-APPOINTMENT (OUTPATIENT)
Age: 72
End: 2022-09-26

## 2022-10-04 ENCOUNTER — NON-APPOINTMENT (OUTPATIENT)
Age: 72
End: 2022-10-04

## 2022-11-04 DIAGNOSIS — Z13.21 ENCOUNTER FOR SCREENING FOR NUTRITIONAL DISORDER: ICD-10-CM

## 2022-11-04 DIAGNOSIS — Z01.818 ENCOUNTER FOR OTHER PREPROCEDURAL EXAMINATION: ICD-10-CM

## 2022-11-04 DIAGNOSIS — Z09 ENCOUNTER FOR FOLLOW-UP EXAMINATION AFTER COMPLETED TREATMENT FOR CONDITIONS OTHER THAN MALIGNANT NEOPLASM: ICD-10-CM

## 2022-11-07 ENCOUNTER — APPOINTMENT (OUTPATIENT)
Dept: SURGERY | Facility: CLINIC | Age: 72
End: 2022-11-07

## 2022-11-07 VITALS
SYSTOLIC BLOOD PRESSURE: 126 MMHG | HEART RATE: 85 BPM | RESPIRATION RATE: 14 BRPM | BODY MASS INDEX: 51 KG/M2 | DIASTOLIC BLOOD PRESSURE: 78 MMHG | WEIGHT: 270.13 LBS | TEMPERATURE: 96.9 F | HEIGHT: 61 IN | OXYGEN SATURATION: 96 %

## 2022-11-07 DIAGNOSIS — E66.01 MORBID (SEVERE) OBESITY DUE TO EXCESS CALORIES: ICD-10-CM

## 2022-11-07 PROBLEM — E78.5 HYPERLIPIDEMIA, UNSPECIFIED: Chronic | Status: ACTIVE | Noted: 2022-08-24

## 2022-11-07 PROBLEM — M19.90 UNSPECIFIED OSTEOARTHRITIS, UNSPECIFIED SITE: Chronic | Status: ACTIVE | Noted: 2022-08-24

## 2022-11-07 PROBLEM — G47.33 OBSTRUCTIVE SLEEP APNEA (ADULT) (PEDIATRIC): Chronic | Status: ACTIVE | Noted: 2022-08-24

## 2022-11-07 PROCEDURE — 99024 POSTOP FOLLOW-UP VISIT: CPT

## 2022-11-07 NOTE — HISTORY OF PRESENT ILLNESS
[Procedure: ___] : Procedure performed: [unfilled]  [Date of Surgery: ___] : Date of Surgery:   [unfilled] [Surgeon Name:   ___] : Surgeon Name: Dr. MACHUCA [Pre-Op Weight ___] : Pre-op weight was [unfilled] lbs [___ Months Post Op] : [unfilled] months [de-identified] : Having occasional dysphagia and nausea, especially with dense meats. Chewing gum helps. [Phase 2] : Phase 2

## 2022-11-07 NOTE — ASSESSMENT
[___ Days Post Op] : [unfilled] days [Today's Weight: ___] : Today's weight:[unfilled] lbs [Today's BMI: ___] : Today's BMI: [unfilled] [de-identified] : Doing well s/p lap sleeve gastrectomy, hiatal hernia repair.

## 2022-11-16 LAB — H PYLORI AG STL QL: NEGATIVE

## 2022-11-21 ENCOUNTER — OUTPATIENT (OUTPATIENT)
Dept: OUTPATIENT SERVICES | Facility: HOSPITAL | Age: 72
LOS: 1 days | End: 2022-11-21
Payer: MEDICARE

## 2022-11-21 DIAGNOSIS — Z98.890 OTHER SPECIFIED POSTPROCEDURAL STATES: Chronic | ICD-10-CM

## 2022-11-21 DIAGNOSIS — E66.01 MORBID (SEVERE) OBESITY DUE TO EXCESS CALORIES: ICD-10-CM

## 2022-11-21 PROCEDURE — 74246 X-RAY XM UPR GI TRC 2CNTRST: CPT

## 2022-11-21 PROCEDURE — 74246 X-RAY XM UPR GI TRC 2CNTRST: CPT | Mod: 26

## 2022-12-05 ENCOUNTER — APPOINTMENT (OUTPATIENT)
Dept: SURGERY | Facility: CLINIC | Age: 72
End: 2022-12-05

## 2022-12-05 VITALS
WEIGHT: 260.25 LBS | OXYGEN SATURATION: 97 % | RESPIRATION RATE: 16 BRPM | DIASTOLIC BLOOD PRESSURE: 82 MMHG | HEART RATE: 78 BPM | TEMPERATURE: 96.5 F | BODY MASS INDEX: 49.14 KG/M2 | HEIGHT: 61 IN | SYSTOLIC BLOOD PRESSURE: 145 MMHG

## 2022-12-05 PROCEDURE — 99213 OFFICE O/P EST LOW 20 MIN: CPT | Mod: 24

## 2022-12-05 NOTE — HISTORY OF PRESENT ILLNESS
[Date of Surgery: ___] : Date of Surgery:   [unfilled] [Phase 2] : Phase 2 [Walking] : walking [de-identified] : Ms. Dukes present for her post op visit. The patient reports that she is still experiencing difficulty swallowing more solid food to the point that food is getting stuck in her throat and causing her to vomit. She report that these food includes meats and some vegetables. She report that she can tolerated soft food such as pureed chicken and oatmeal. The paitent recently had an Upper gi series that showed showed narrow at the ge junction that is concerning for slight reoccurrence of her hiatal hernia. Additionally, the patient reports that she is taking her vitamins and report limited physical activity due to the need for knee surgery.

## 2022-12-05 NOTE — PHYSICAL EXAM
[de-identified] : EMOI and no sclera icterus  [de-identified] : equal chest rise and nonlabored breathing  [de-identified] : RRR [de-identified] : soft, nontender, and well healed surgery sites.

## 2022-12-05 NOTE — ASSESSMENT
[de-identified] : 72f s/p gastric by pass with hiatal hernia repair that post op course is complicated by dysphagia. The patient recently had ugi performed that showed narrowing at the GE junction that is possible a slight reoccurrence of the patient hiatal hernia. Due to this the patient will be schedule for a revision to resolve the patient dysphagia and aid her post op bariatric course.

## 2022-12-05 NOTE — REVIEW OF SYSTEMS
[Vomiting] : vomiting [Reflux/Heartburn] : reflux/heartburn [Negative] : Endocrine [FreeTextEntry7] : s

## 2022-12-05 NOTE — REASON FOR VISIT
[Gastric By-pass] : gastric by-pass [de-identified] : 3 month post op visit for gastric by pass and hiatal hernia repair.

## 2022-12-16 DIAGNOSIS — K44.9 DIAPHRAGMATIC HERNIA W/OUT OBSTRUCTION OR GANGRENE: ICD-10-CM

## 2022-12-16 LAB
25(OH)D3 SERPL-MCNC: 45.5 NG/ML
ALBUMIN SERPL ELPH-MCNC: 3.9 G/DL
ALP BLD-CCNC: 113 U/L
ALT SERPL-CCNC: 10 U/L
ANION GAP SERPL CALC-SCNC: 9 MMOL/L
AST SERPL-CCNC: 20 U/L
BASOPHILS # BLD AUTO: 0.05 K/UL
BASOPHILS NFR BLD AUTO: 0.8 %
BILIRUB SERPL-MCNC: 0.2 MG/DL
BUN SERPL-MCNC: 15 MG/DL
CALCIUM SERPL-MCNC: 9.8 MG/DL
CHLORIDE SERPL-SCNC: 106 MMOL/L
CO2 SERPL-SCNC: 23 MMOL/L
CREAT SERPL-MCNC: 0.96 MG/DL
EGFR: 64 ML/MIN/1.73M2
EOSINOPHIL # BLD AUTO: 0.31 K/UL
EOSINOPHIL NFR BLD AUTO: 5.2 %
ESTIMATED AVERAGE GLUCOSE: 111 MG/DL
FOLATE SERPL-MCNC: >20 NG/ML
GLUCOSE SERPL-MCNC: 85 MG/DL
HBA1C MFR BLD HPLC: 5.5 %
HCT VFR BLD CALC: 39.7 %
HGB BLD-MCNC: 12 G/DL
IMM GRANULOCYTES NFR BLD AUTO: 0.2 %
IRON SERPL-MCNC: 68 UG/DL
LYMPHOCYTES # BLD AUTO: 2.53 K/UL
LYMPHOCYTES NFR BLD AUTO: 42.4 %
MAN DIFF?: NORMAL
MCHC RBC-ENTMCNC: 29.1 PG
MCHC RBC-ENTMCNC: 30.2 GM/DL
MCV RBC AUTO: 96.4 FL
MONOCYTES # BLD AUTO: 0.49 K/UL
MONOCYTES NFR BLD AUTO: 8.2 %
NEUTROPHILS # BLD AUTO: 2.58 K/UL
NEUTROPHILS NFR BLD AUTO: 43.2 %
PLATELET # BLD AUTO: 287 K/UL
POTASSIUM SERPL-SCNC: 4.5 MMOL/L
PROT SERPL-MCNC: 7.1 G/DL
RBC # BLD: 4.12 M/UL
RBC # FLD: 14.6 %
SODIUM SERPL-SCNC: 138 MMOL/L
VIT B12 SERPL-MCNC: 863 PG/ML
WBC # FLD AUTO: 5.97 K/UL

## 2022-12-20 LAB
VIT B1 SERPL-MCNC: 112.4 NMOL/L
VIT B6 SERPL-MCNC: 17 UG/L

## 2022-12-21 LAB — ZINC SERPL-MCNC: 92 UG/DL

## 2022-12-30 ENCOUNTER — OUTPATIENT (OUTPATIENT)
Dept: OUTPATIENT SERVICES | Facility: HOSPITAL | Age: 72
LOS: 1 days | End: 2022-12-30
Payer: MEDICARE

## 2022-12-30 VITALS
HEART RATE: 68 BPM | SYSTOLIC BLOOD PRESSURE: 120 MMHG | OXYGEN SATURATION: 97 % | TEMPERATURE: 97 F | RESPIRATION RATE: 17 BRPM | WEIGHT: 260.15 LBS | HEIGHT: 61 IN | DIASTOLIC BLOOD PRESSURE: 80 MMHG

## 2022-12-30 DIAGNOSIS — I10 ESSENTIAL (PRIMARY) HYPERTENSION: ICD-10-CM

## 2022-12-30 DIAGNOSIS — Z01.818 ENCOUNTER FOR OTHER PREPROCEDURAL EXAMINATION: ICD-10-CM

## 2022-12-30 DIAGNOSIS — Z13.89 ENCOUNTER FOR SCREENING FOR OTHER DISORDER: ICD-10-CM

## 2022-12-30 DIAGNOSIS — Z98.890 OTHER SPECIFIED POSTPROCEDURAL STATES: Chronic | ICD-10-CM

## 2022-12-30 DIAGNOSIS — E78.5 HYPERLIPIDEMIA, UNSPECIFIED: ICD-10-CM

## 2022-12-30 DIAGNOSIS — Z29.9 ENCOUNTER FOR PROPHYLACTIC MEASURES, UNSPECIFIED: ICD-10-CM

## 2022-12-30 DIAGNOSIS — R13.10 DYSPHAGIA, UNSPECIFIED: ICD-10-CM

## 2022-12-30 LAB
ANION GAP SERPL CALC-SCNC: 11 MMOL/L — SIGNIFICANT CHANGE UP (ref 5–17)
APTT BLD: 31.6 SEC — SIGNIFICANT CHANGE UP (ref 27.5–35.5)
BASOPHILS # BLD AUTO: 0.05 K/UL — SIGNIFICANT CHANGE UP (ref 0–0.2)
BASOPHILS NFR BLD AUTO: 0.9 % — SIGNIFICANT CHANGE UP (ref 0–2)
BLD GP AB SCN SERPL QL: SIGNIFICANT CHANGE UP
BUN SERPL-MCNC: 16.9 MG/DL — SIGNIFICANT CHANGE UP (ref 8–20)
CALCIUM SERPL-MCNC: 9.6 MG/DL — SIGNIFICANT CHANGE UP (ref 8.4–10.5)
CHLORIDE SERPL-SCNC: 104 MMOL/L — SIGNIFICANT CHANGE UP (ref 96–108)
CO2 SERPL-SCNC: 24 MMOL/L — SIGNIFICANT CHANGE UP (ref 22–29)
CREAT SERPL-MCNC: 0.77 MG/DL — SIGNIFICANT CHANGE UP (ref 0.5–1.3)
EGFR: 83 ML/MIN/1.73M2 — SIGNIFICANT CHANGE UP
EOSINOPHIL # BLD AUTO: 0.21 K/UL — SIGNIFICANT CHANGE UP (ref 0–0.5)
EOSINOPHIL NFR BLD AUTO: 3.8 % — SIGNIFICANT CHANGE UP (ref 0–6)
GLUCOSE SERPL-MCNC: 93 MG/DL — SIGNIFICANT CHANGE UP (ref 70–99)
HCT VFR BLD CALC: 38.7 % — SIGNIFICANT CHANGE UP (ref 34.5–45)
HGB BLD-MCNC: 12.3 G/DL — SIGNIFICANT CHANGE UP (ref 11.5–15.5)
IMM GRANULOCYTES NFR BLD AUTO: 0.2 % — SIGNIFICANT CHANGE UP (ref 0–0.9)
INR BLD: 1.03 RATIO — SIGNIFICANT CHANGE UP (ref 0.88–1.16)
LYMPHOCYTES # BLD AUTO: 1.86 K/UL — SIGNIFICANT CHANGE UP (ref 1–3.3)
LYMPHOCYTES # BLD AUTO: 34 % — SIGNIFICANT CHANGE UP (ref 13–44)
MCHC RBC-ENTMCNC: 29.1 PG — SIGNIFICANT CHANGE UP (ref 27–34)
MCHC RBC-ENTMCNC: 31.8 GM/DL — LOW (ref 32–36)
MCV RBC AUTO: 91.7 FL — SIGNIFICANT CHANGE UP (ref 80–100)
MONOCYTES # BLD AUTO: 0.53 K/UL — SIGNIFICANT CHANGE UP (ref 0–0.9)
MONOCYTES NFR BLD AUTO: 9.7 % — SIGNIFICANT CHANGE UP (ref 2–14)
NEUTROPHILS # BLD AUTO: 2.81 K/UL — SIGNIFICANT CHANGE UP (ref 1.8–7.4)
NEUTROPHILS NFR BLD AUTO: 51.4 % — SIGNIFICANT CHANGE UP (ref 43–77)
PLATELET # BLD AUTO: 307 K/UL — SIGNIFICANT CHANGE UP (ref 150–400)
POTASSIUM SERPL-MCNC: 4.5 MMOL/L — SIGNIFICANT CHANGE UP (ref 3.5–5.3)
POTASSIUM SERPL-SCNC: 4.5 MMOL/L — SIGNIFICANT CHANGE UP (ref 3.5–5.3)
PROTHROM AB SERPL-ACNC: 12 SEC — SIGNIFICANT CHANGE UP (ref 10.5–13.4)
RBC # BLD: 4.22 M/UL — SIGNIFICANT CHANGE UP (ref 3.8–5.2)
RBC # FLD: 14.1 % — SIGNIFICANT CHANGE UP (ref 10.3–14.5)
SODIUM SERPL-SCNC: 138 MMOL/L — SIGNIFICANT CHANGE UP (ref 135–145)
WBC # BLD: 5.47 K/UL — SIGNIFICANT CHANGE UP (ref 3.8–10.5)
WBC # FLD AUTO: 5.47 K/UL — SIGNIFICANT CHANGE UP (ref 3.8–10.5)

## 2022-12-30 PROCEDURE — G0463: CPT

## 2022-12-30 PROCEDURE — 93005 ELECTROCARDIOGRAM TRACING: CPT

## 2022-12-30 PROCEDURE — 93010 ELECTROCARDIOGRAM REPORT: CPT

## 2022-12-30 RX ORDER — CEFAZOLIN SODIUM 1 G
2000 VIAL (EA) INJECTION ONCE
Refills: 0 | Status: DISCONTINUED | OUTPATIENT
Start: 2023-01-11 | End: 2023-01-11

## 2022-12-30 RX ORDER — LEVOCETIRIZINE DIHYDROCHLORIDE 0.5 MG/ML
1 SOLUTION ORAL
Qty: 0 | Refills: 0 | DISCHARGE

## 2022-12-30 RX ORDER — LISINOPRIL 2.5 MG/1
1 TABLET ORAL
Qty: 0 | Refills: 0 | DISCHARGE

## 2022-12-30 NOTE — H&P PST ADULT - PROBLEM SELECTOR PLAN 4
Caprini - 5 moderate risk   Surgical team please assess/recommend mechanical/pharmacological measures for VTE prophylaxis

## 2022-12-30 NOTE — H&P PST ADULT - PROBLEM SELECTOR PLAN 1
73 y/o female with PMH of CHF, HTN, GERD, TERESA with no tx, osteoarthritis, anxiety and depression,  HLD now with dysphagia, s/p gastric bypass and hiatal hernia repair scheduled for laparoscopic recurrent hiatal hernia 1/11/2023.    -Medical evaluation pending  -Cardiac evaluation pending  - NPO after midnight the night before surgery except for meds  -Educated on NSAIDS, multivitamins and herbals that increase the risk of bleeding and need to be stopped 5 days before procedure  -Educated on infection prevention  -Covid testing 3-5 days prior to surgery   -Tylenol can be taken 5 days before surgery if needed for pain  -Will continue all other medications as prescribed  -Verbalized understanding of all instructions.

## 2022-12-30 NOTE — H&P PST ADULT - MUSCULOSKELETAL
details… no joint swelling/no joint erythema/decreased ROM/strength 5/5 bilateral upper extremities/abnormal gait

## 2022-12-30 NOTE — H&P PST ADULT - HISTORY OF PRESENT ILLNESS
of Present Illness    Procedure Details: Date of Surgery: 9/6/22  3 months post op for gastric and hiata hernia repair.    Ms. Dukes present for her post op visit. The patient reports that she is still experiencing difficulty swallowing more solid food to the point that food is getting stuck in her throat and causing her to vomit. She report that these food includes meats and some vegetables. She report that she can tolerated soft food such as pureed chicken and oatmeal. The paitent recently had an Upper gi series that showed showed narrow at the ge junction that is concerning for slight reoccurrence of her hiatal hernia. Additionally, the patient reports that she is taking her vitamins and report limited physical activity due to the need for knee surgery.    73 y/o female with PMH of CHF, HTN, GERD, TERESA with no tx, osteoarthritis, anxiety and depression,  HLD presents to [facility] today. Reports that she is s/p gastric bypass and hiatal hernia repair 9/6/2022. Reports that she has been experiencing dysphagia, N/V while swallowing meat, bread, vegetables since the surgery. She report that she can tolerated soft food such as pureed chicken and oatmeal. She recently had an Upper gi series that showed showed narrow at the ge junction that is concerning for slight reoccurrence of her hiatal hernia. Reports abdominal pain in the morning and wakes her up in the middle of the night. Reports she takes a protein shake before sleep and that helps with the abdominal pain.     Procedure Details: Date of Surgery: 9/6/22  3 months post op for gastric and hiata hernia repair.    Ms. Dukes present for her post op visit. The patient reports that she is still experiencing difficulty swallowing more solid food to the point that food is getting stuck in her throat and causing her to vomit. She report that these food includes meats and some vegetables. She report that she can tolerated soft food such as pureed chicken and oatmeal. The paitent recently had an Upper gi series that showed showed narrow at the ge junction that is concerning for slight reoccurrence of her hiatal hernia. Additionally, the patient reports that she is taking her vitamins and report limited physical activity due to the need for knee surgery.    73 y/o female with PMH of CHF, HTN, GERD, TERESA with no tx, osteoarthritis, anxiety and depression,  HLD presents to PST today. Reports that she is s/p gastric bypass and hiatal hernia repair 9/6/2022. Reports that she has been experiencing dysphagia, N/V while swallowing meat, bread, vegetables since the surgery. She report that she can tolerated soft food such as pureed chicken and oatmeal. She recently had an Upper gi series that showed narrow at the ge junction that is concerning for slight reoccurrence of her hiatal hernia. Reports abdominal pain in the morning and wakes her up in the middle of the night. Reports she takes a protein shake before sleep and that helps with the abdominal pain. Scheduled for laparoscopic recurrent hiatal hernia 1/11/2023.

## 2022-12-30 NOTE — H&P PST ADULT - ASSESSMENT
73 y/o female with PMH of CHF, HTN, GERD, TERESA with no tx, osteoarthritis, anxiety and depression,  HLD now with dysphagia, s/p gastric bypass and hiatal hernia repair scheduled for laparoscopic recurrent hiatal hernia 2023.    Medical evaluation pending  -Cardiac evaluation pending  - NPO after midnight the night before surgery except for meds  -Educated on NSAIDS, multivitamins and herbals that increase the risk of bleeding and need to be stopped 5 days before procedure  -Educated on infection prevention  -Covid testing 3-5 days prior to surgery   -Tylenol can be taken 5 days before surgery if needed for pain  -Will continue all other medications as prescribed  -Verbalized understanding of all instructions.    OPIOID RISK TOOL    DANY EACH BOX THAT APPLIES AND ADD TOTALS AT THE END    FAMILY HISTORY OF SUBSTANCE ABUSE                 FEMALE         MALE                                                Alcohol                             [  ]1 pt          [  ]3pts                                               Illegal Durgs                     [  ]2 pts        [  ]3pts                                               Rx Drugs                           [  ]4 pts        [  ]4 pts    PERSONAL HISTORY OF SUBSTANCE ABUSE                                                                                          Alcohol                             [  ]3 pts       [  ]3 pts                                               Illegal Drugs                     [  ]4 pts        [  ]4 pts                                               Rx Drugs                           [  ]5 pts        [  ]5 pts    AGE BETWEEN 16-45 YEARS                                      [  ]1 pt         [  ]1 pt    HISTORY OF PREADOLESCENT   SEXUAL ABUSE                                                             [  ]3 pts        [  ]0pts    PSYCHOLOGICAL DISEASE                     ADD, OCD, Bipolar, Schizophrenia        [  ]2 pts         [  ]2 pts                      Depression                                               [  x]1 pt           [  ]1 pt           SCORING TOTAL   (add numbers and type here)              (1)                                     A score of 3 or lower indicated LOW risk for future opioid abuse  A score of 4 to 7 indicated moderate risk for future opioid abuse  A score of 8 or higher indicates a high risk for opioid abuse    CAPRINI SCORE [CLOT]    AGE RELATED RISK FACTORS                                                       MOBILITY RELATED FACTORS  [ ] Age 41-60 years                                            (1 Point)                  [ ] Bed rest                                                        (1 Point)  [ x] Age: 61-74 years                                           (2 Points)                 [ ] Plaster cast                                                   (2 Points)  [ ] Age= 75 years                                              (3 Points)                 [ ] Bed bound for more than 72 hours                 (2 Points)    DISEASE RELATED RISK FACTORS                                               GENDER SPECIFIC FACTORS  [ ] Edema in the lower extremities                       (1 Point)                  [ ] Pregnancy                                                     (1 Point)  [ ] Varicose veins                                               (1 Point)                  [ ] Post-partum < 6 weeks                                   (1 Point)             [x ] BMI > 25 Kg/m2                                            (1 Point)                  [ ] Hormonal therapy  or oral contraception          (1 Point)                 [ ] Sepsis (in the previous month)                        (1 Point)                  [ ] History of pregnancy complications                 (1 point)  [ ] Pneumonia or serious lung disease                                               [ ] Unexplained or recurrent                     (1 Point)           (in the previous month)                               (1 Point)  [ ] Abnormal pulmonary function test                     (1 Point)                 SURGERY RELATED RISK FACTORS  [ ] Acute myocardial infarction                              (1 Point)                 [ ]  Section                                             (1 Point)  [ ] Congestive heart failure (in the previous month)  (1 Point)               [ ] Minor surgery                                                  (1 Point)   [ ] Inflammatory bowel disease                             (1 Point)                 [ ] Arthroscopic surgery                                        (2 Points)  [ ] Central venous access                                      (2 Points)                [ x] General surgery lasting more than 45 minutes   (2 Points)       [ ] Stroke (in the previous month)                          (5 Points)               [ ] Elective arthroplasty                                         (5 Points)                                                                                                                                               HEMATOLOGY RELATED FACTORS                                                 TRAUMA RELATED RISK FACTORS  [ ] Prior episodes of VTE                                     (3 Points)                [ ] Fracture of the hip, pelvis, or leg                       (5 Points)  [ ] Positive family history for VTE                         (3 Points)                 [ ] Acute spinal cord injury (in the previous month)  (5 Points)  [ ] Prothrombin 91794 A                                     (3 Points)                 [ ] Paralysis  (less than 1 month)                             (5 Points)  [ ] Factor V Leiden                                             (3 Points)                  [ ] Multiple Trauma within 1 month                        (5 Points)  [ ] Lupus anticoagulants                                     (3 Points)                                                           [ ] Anticardiolipin antibodies                               (3 Points)                                                       [ ] High homocysteine in the blood                      (3 Points)                                             [ ] Other congenital or acquired thrombophilia      (3 Points)                                                [ ] Heparin induced thrombocytopenia                  (3 Points)                                          Total Score [      5    ]    Caprini Score 0 - 2:  Low Risk, No VTE Prophylaxis required for most patients, encourage ambulation  Caprini Score 3 - 6:  At Risk, pharmacologic VTE prophylaxis is indicated for most patients (in the absence of a contraindication)  Caprini Score Greater than or = 7:  High Risk, pharmacologic VTE prophylaxis is indicated for most patients (in the absence of a contraindication)

## 2023-01-09 LAB — SARS-COV-2 N GENE NPH QL NAA+PROBE: NOT DETECTED

## 2023-01-10 ENCOUNTER — TRANSCRIPTION ENCOUNTER (OUTPATIENT)
Age: 73
End: 2023-01-10

## 2023-01-11 ENCOUNTER — APPOINTMENT (OUTPATIENT)
Dept: SURGERY | Facility: HOSPITAL | Age: 73
End: 2023-01-11

## 2023-01-11 ENCOUNTER — INPATIENT (INPATIENT)
Facility: HOSPITAL | Age: 73
LOS: 0 days | Discharge: HOME | DRG: 328 | End: 2023-01-12
Attending: SURGERY | Admitting: SURGERY
Payer: MEDICARE

## 2023-01-11 VITALS
RESPIRATION RATE: 16 BRPM | HEART RATE: 62 BPM | WEIGHT: 260.15 LBS | HEIGHT: 61 IN | OXYGEN SATURATION: 100 % | DIASTOLIC BLOOD PRESSURE: 92 MMHG | SYSTOLIC BLOOD PRESSURE: 112 MMHG | TEMPERATURE: 98 F

## 2023-01-11 DIAGNOSIS — Z98.890 OTHER SPECIFIED POSTPROCEDURAL STATES: Chronic | ICD-10-CM

## 2023-01-11 DIAGNOSIS — R13.10 DYSPHAGIA, UNSPECIFIED: ICD-10-CM

## 2023-01-11 DIAGNOSIS — Z90.3 ACQUIRED ABSENCE OF STOMACH [PART OF]: ICD-10-CM

## 2023-01-11 LAB — GLUCOSE BLDC GLUCOMTR-MCNC: 146 MG/DL — HIGH (ref 70–99)

## 2023-01-11 PROCEDURE — 43200 ESOPHAGOSCOPY FLEXIBLE BRUSH: CPT

## 2023-01-11 PROCEDURE — 44180 LAP ENTEROLYSIS: CPT

## 2023-01-11 RX ORDER — KETOROLAC TROMETHAMINE 30 MG/ML
15 SYRINGE (ML) INJECTION EVERY 6 HOURS
Refills: 0 | Status: COMPLETED | OUTPATIENT
Start: 2023-01-11 | End: 2023-01-12

## 2023-01-11 RX ORDER — DEXTROSE 50 % IN WATER 50 %
25 SYRINGE (ML) INTRAVENOUS ONCE
Refills: 0 | Status: DISCONTINUED | OUTPATIENT
Start: 2023-01-11 | End: 2023-01-12

## 2023-01-11 RX ORDER — LANOLIN ALCOHOL/MO/W.PET/CERES
10 CREAM (GRAM) TOPICAL AT BEDTIME
Refills: 0 | Status: DISCONTINUED | OUTPATIENT
Start: 2023-01-11 | End: 2023-01-12

## 2023-01-11 RX ORDER — DEXTROSE 50 % IN WATER 50 %
15 SYRINGE (ML) INTRAVENOUS ONCE
Refills: 0 | Status: DISCONTINUED | OUTPATIENT
Start: 2023-01-11 | End: 2023-01-12

## 2023-01-11 RX ORDER — DEXTROSE 50 % IN WATER 50 %
12.5 SYRINGE (ML) INTRAVENOUS ONCE
Refills: 0 | Status: DISCONTINUED | OUTPATIENT
Start: 2023-01-11 | End: 2023-01-12

## 2023-01-11 RX ORDER — HEPARIN SODIUM 5000 [USP'U]/ML
5000 INJECTION INTRAVENOUS; SUBCUTANEOUS EVERY 8 HOURS
Refills: 0 | Status: DISCONTINUED | OUTPATIENT
Start: 2023-01-11 | End: 2023-01-12

## 2023-01-11 RX ORDER — TRIAMTERENE/HYDROCHLOROTHIAZID 75 MG-50MG
1 TABLET ORAL DAILY
Refills: 0 | Status: DISCONTINUED | OUTPATIENT
Start: 2023-01-11 | End: 2023-01-12

## 2023-01-11 RX ORDER — ACETAMINOPHEN 500 MG
1000 TABLET ORAL EVERY 6 HOURS
Refills: 0 | Status: DISCONTINUED | OUTPATIENT
Start: 2023-01-11 | End: 2023-01-12

## 2023-01-11 RX ORDER — CITALOPRAM 10 MG/1
10 TABLET, FILM COATED ORAL DAILY
Refills: 0 | Status: DISCONTINUED | OUTPATIENT
Start: 2023-01-11 | End: 2023-01-12

## 2023-01-11 RX ORDER — PREGABALIN 225 MG/1
0 CAPSULE ORAL
Qty: 0 | Refills: 0 | DISCHARGE

## 2023-01-11 RX ORDER — ASCORBIC ACID 60 MG
1 TABLET,CHEWABLE ORAL
Qty: 0 | Refills: 0 | DISCHARGE

## 2023-01-11 RX ORDER — INSULIN LISPRO 100/ML
VIAL (ML) SUBCUTANEOUS
Refills: 0 | Status: DISCONTINUED | OUTPATIENT
Start: 2023-01-11 | End: 2023-01-12

## 2023-01-11 RX ORDER — LOSARTAN POTASSIUM 100 MG/1
50 TABLET, FILM COATED ORAL DAILY
Refills: 0 | Status: DISCONTINUED | OUTPATIENT
Start: 2023-01-12 | End: 2023-01-12

## 2023-01-11 RX ORDER — PANTOPRAZOLE SODIUM 20 MG/1
40 TABLET, DELAYED RELEASE ORAL
Refills: 0 | Status: DISCONTINUED | OUTPATIENT
Start: 2023-01-11 | End: 2023-01-12

## 2023-01-11 RX ORDER — SODIUM CHLORIDE 9 MG/ML
3 INJECTION INTRAMUSCULAR; INTRAVENOUS; SUBCUTANEOUS ONCE
Refills: 0 | Status: DISCONTINUED | OUTPATIENT
Start: 2023-01-11 | End: 2023-01-11

## 2023-01-11 RX ORDER — FENTANYL CITRATE 50 UG/ML
50 INJECTION INTRAVENOUS
Refills: 0 | Status: DISCONTINUED | OUTPATIENT
Start: 2023-01-11 | End: 2023-01-11

## 2023-01-11 RX ORDER — GLUCAGON INJECTION, SOLUTION 0.5 MG/.1ML
1 INJECTION, SOLUTION SUBCUTANEOUS ONCE
Refills: 0 | Status: DISCONTINUED | OUTPATIENT
Start: 2023-01-11 | End: 2023-01-12

## 2023-01-11 RX ORDER — MULTIVIT-MIN/FERROUS GLUCONATE 9 MG/15 ML
1 LIQUID (ML) ORAL DAILY
Refills: 0 | Status: DISCONTINUED | OUTPATIENT
Start: 2023-01-11 | End: 2023-01-12

## 2023-01-11 RX ORDER — SODIUM CHLORIDE 9 MG/ML
1000 INJECTION INTRAMUSCULAR; INTRAVENOUS; SUBCUTANEOUS
Refills: 0 | Status: DISCONTINUED | OUTPATIENT
Start: 2023-01-11 | End: 2023-01-12

## 2023-01-11 RX ORDER — MULTIVIT-MIN/FERROUS GLUCONATE 9 MG/15 ML
0 LIQUID (ML) ORAL
Qty: 0 | Refills: 0 | DISCHARGE

## 2023-01-11 RX ORDER — SODIUM CHLORIDE 9 MG/ML
1000 INJECTION, SOLUTION INTRAVENOUS
Refills: 0 | Status: DISCONTINUED | OUTPATIENT
Start: 2023-01-11 | End: 2023-01-11

## 2023-01-11 RX ORDER — OLMESARTAN MEDOXOMIL 5 MG/1
1 TABLET, FILM COATED ORAL
Qty: 0 | Refills: 0 | DISCHARGE

## 2023-01-11 RX ORDER — SODIUM CHLORIDE 9 MG/ML
1000 INJECTION, SOLUTION INTRAVENOUS
Refills: 0 | Status: DISCONTINUED | OUTPATIENT
Start: 2023-01-11 | End: 2023-01-12

## 2023-01-11 RX ORDER — SENNOSIDES/DOCUSATE SODIUM 8.6MG-50MG
0 TABLET ORAL
Qty: 0 | Refills: 0 | DISCHARGE

## 2023-01-11 RX ORDER — KETOROLAC TROMETHAMINE 30 MG/ML
30 SYRINGE (ML) INJECTION EVERY 6 HOURS
Refills: 0 | Status: DISCONTINUED | OUTPATIENT
Start: 2023-01-11 | End: 2023-01-12

## 2023-01-11 RX ORDER — CITALOPRAM 10 MG/1
0 TABLET, FILM COATED ORAL
Qty: 0 | Refills: 0 | DISCHARGE

## 2023-01-11 RX ORDER — ACETAMINOPHEN 500 MG
975 TABLET ORAL ONCE
Refills: 0 | Status: COMPLETED | OUTPATIENT
Start: 2023-01-11 | End: 2023-01-11

## 2023-01-11 RX ORDER — FENTANYL CITRATE 50 UG/ML
25 INJECTION INTRAVENOUS
Refills: 0 | Status: DISCONTINUED | OUTPATIENT
Start: 2023-01-11 | End: 2023-01-11

## 2023-01-11 RX ORDER — BENZOYL PEROXIDE MICRONIZED 5.8 %
1 TOWELETTE (EA) TOPICAL
Qty: 0 | Refills: 0 | DISCHARGE

## 2023-01-11 RX ORDER — METOPROLOL TARTRATE 50 MG
50 TABLET ORAL EVERY 12 HOURS
Refills: 0 | Status: DISCONTINUED | OUTPATIENT
Start: 2023-01-12 | End: 2023-01-12

## 2023-01-11 RX ORDER — ONDANSETRON 8 MG/1
4 TABLET, FILM COATED ORAL ONCE
Refills: 0 | Status: DISCONTINUED | OUTPATIENT
Start: 2023-01-11 | End: 2023-01-11

## 2023-01-11 RX ORDER — CELECOXIB 200 MG/1
400 CAPSULE ORAL ONCE
Refills: 0 | Status: COMPLETED | OUTPATIENT
Start: 2023-01-11 | End: 2023-01-11

## 2023-01-11 RX ORDER — TRIAMTERENE/HYDROCHLOROTHIAZID 75 MG-50MG
1 TABLET ORAL
Qty: 0 | Refills: 0 | DISCHARGE

## 2023-01-11 RX ORDER — CEFAZOLIN SODIUM 1 G
2000 VIAL (EA) INJECTION ONCE
Refills: 0 | Status: DISCONTINUED | OUTPATIENT
Start: 2023-01-11 | End: 2023-01-11

## 2023-01-11 RX ORDER — BUPIVACAINE 13.3 MG/ML
20 INJECTION, SUSPENSION, LIPOSOMAL INFILTRATION ONCE
Refills: 0 | Status: DISCONTINUED | OUTPATIENT
Start: 2023-01-11 | End: 2023-01-11

## 2023-01-11 RX ORDER — LANOLIN ALCOHOL/MO/W.PET/CERES
1 CREAM (GRAM) TOPICAL
Qty: 0 | Refills: 0 | DISCHARGE

## 2023-01-11 RX ADMIN — FENTANYL CITRATE 25 MICROGRAM(S): 50 INJECTION INTRAVENOUS at 19:45

## 2023-01-11 RX ADMIN — SODIUM CHLORIDE 100 MILLILITER(S): 9 INJECTION INTRAMUSCULAR; INTRAVENOUS; SUBCUTANEOUS at 21:27

## 2023-01-11 RX ADMIN — Medication 15 MILLIGRAM(S): at 23:58

## 2023-01-11 RX ADMIN — HEPARIN SODIUM 5000 UNIT(S): 5000 INJECTION INTRAVENOUS; SUBCUTANEOUS at 23:57

## 2023-01-11 RX ADMIN — Medication 30 MILLIGRAM(S): at 20:30

## 2023-01-11 RX ADMIN — Medication 10 MILLIGRAM(S): at 23:55

## 2023-01-11 RX ADMIN — Medication 975 MILLIGRAM(S): at 14:54

## 2023-01-11 RX ADMIN — CELECOXIB 400 MILLIGRAM(S): 200 CAPSULE ORAL at 14:54

## 2023-01-11 NOTE — ASU PREOP CHECKLIST - CHLOROHEXIDINE WASH 3
11-Jan-2023 Complex Repair And Tissue Cultured Epidermal Autograft Text: The defect edges were debeveled with a #15 scalpel blade.  The primary defect was closed partially with a complex linear closure.  Given the location of the defect, shape of the defect and the proximity to free margins an tissue cultured epidermal autograft was deemed most appropriate to repair the remaining defect.  The graft was trimmed to fit the size of the remaining defect.  The graft was then placed in the primary defect, oriented appropriately, and sutured into place.

## 2023-01-11 NOTE — ASU PREOP CHECKLIST - SPO2 (%)
Pt was admitted for scheduled hysterectomy.  RALH/Bilateral Salpingectomy/Mini-laparotomy for specimen removal was performed without complications.      3/16/2018  Robotic hysterectomy with bilateral salpingectomy done with no complications   03/17/2018 Ready for discharge post op day 1 with no complaints   
100

## 2023-01-11 NOTE — BRIEF OPERATIVE NOTE - NSICDXBRIEFPROCEDURE_GEN_ALL_CORE_FT
PROCEDURES:  Revision of repair, hernia, hiatal, laparoscopic 11-Jan-2023 19:40:44 laparoscopic lysis of hiatal lysis of adhesion Natalie Thompson  EGD, intraoperative 11-Jan-2023 19:43:01  Natalie Thompson

## 2023-01-12 ENCOUNTER — TRANSCRIPTION ENCOUNTER (OUTPATIENT)
Age: 73
End: 2023-01-12

## 2023-01-12 VITALS
DIASTOLIC BLOOD PRESSURE: 80 MMHG | OXYGEN SATURATION: 98 % | RESPIRATION RATE: 18 BRPM | TEMPERATURE: 98 F | HEART RATE: 64 BPM | SYSTOLIC BLOOD PRESSURE: 133 MMHG

## 2023-01-12 LAB
A1C WITH ESTIMATED AVERAGE GLUCOSE RESULT: 5.2 % — SIGNIFICANT CHANGE UP (ref 4–5.6)
ANION GAP SERPL CALC-SCNC: 10 MMOL/L — SIGNIFICANT CHANGE UP (ref 5–17)
APTT BLD: 30.6 SEC — SIGNIFICANT CHANGE UP (ref 27.5–35.5)
BASOPHILS # BLD AUTO: 0.01 K/UL — SIGNIFICANT CHANGE UP (ref 0–0.2)
BASOPHILS NFR BLD AUTO: 0.1 % — SIGNIFICANT CHANGE UP (ref 0–2)
BUN SERPL-MCNC: 16.9 MG/DL — SIGNIFICANT CHANGE UP (ref 8–20)
CALCIUM SERPL-MCNC: 8.9 MG/DL — SIGNIFICANT CHANGE UP (ref 8.4–10.5)
CHLORIDE SERPL-SCNC: 105 MMOL/L — SIGNIFICANT CHANGE UP (ref 96–108)
CO2 SERPL-SCNC: 22 MMOL/L — SIGNIFICANT CHANGE UP (ref 22–29)
CREAT SERPL-MCNC: 0.96 MG/DL — SIGNIFICANT CHANGE UP (ref 0.5–1.3)
EGFR: 64 ML/MIN/1.73M2 — SIGNIFICANT CHANGE UP
EOSINOPHIL # BLD AUTO: 0 K/UL — SIGNIFICANT CHANGE UP (ref 0–0.5)
EOSINOPHIL NFR BLD AUTO: 0 % — SIGNIFICANT CHANGE UP (ref 0–6)
ESTIMATED AVERAGE GLUCOSE: 103 MG/DL — SIGNIFICANT CHANGE UP (ref 68–114)
GLUCOSE BLDC GLUCOMTR-MCNC: 136 MG/DL — HIGH (ref 70–99)
GLUCOSE BLDC GLUCOMTR-MCNC: 71 MG/DL — SIGNIFICANT CHANGE UP (ref 70–99)
GLUCOSE SERPL-MCNC: 117 MG/DL — HIGH (ref 70–99)
HCT VFR BLD CALC: 35.2 % — SIGNIFICANT CHANGE UP (ref 34.5–45)
HGB BLD-MCNC: 11.2 G/DL — LOW (ref 11.5–15.5)
IMM GRANULOCYTES NFR BLD AUTO: 0.3 % — SIGNIFICANT CHANGE UP (ref 0–0.9)
INR BLD: 1.07 RATIO — SIGNIFICANT CHANGE UP (ref 0.88–1.16)
LYMPHOCYTES # BLD AUTO: 1.28 K/UL — SIGNIFICANT CHANGE UP (ref 1–3.3)
LYMPHOCYTES # BLD AUTO: 17.9 % — SIGNIFICANT CHANGE UP (ref 13–44)
MAGNESIUM SERPL-MCNC: 1.8 MG/DL — SIGNIFICANT CHANGE UP (ref 1.8–2.6)
MCHC RBC-ENTMCNC: 29.2 PG — SIGNIFICANT CHANGE UP (ref 27–34)
MCHC RBC-ENTMCNC: 31.8 GM/DL — LOW (ref 32–36)
MCV RBC AUTO: 91.9 FL — SIGNIFICANT CHANGE UP (ref 80–100)
MONOCYTES # BLD AUTO: 0.29 K/UL — SIGNIFICANT CHANGE UP (ref 0–0.9)
MONOCYTES NFR BLD AUTO: 4.1 % — SIGNIFICANT CHANGE UP (ref 2–14)
NEUTROPHILS # BLD AUTO: 5.55 K/UL — SIGNIFICANT CHANGE UP (ref 1.8–7.4)
NEUTROPHILS NFR BLD AUTO: 77.6 % — HIGH (ref 43–77)
PHOSPHATE SERPL-MCNC: 4.3 MG/DL — SIGNIFICANT CHANGE UP (ref 2.4–4.7)
PLATELET # BLD AUTO: 272 K/UL — SIGNIFICANT CHANGE UP (ref 150–400)
POTASSIUM SERPL-MCNC: 4.5 MMOL/L — SIGNIFICANT CHANGE UP (ref 3.5–5.3)
POTASSIUM SERPL-SCNC: 4.5 MMOL/L — SIGNIFICANT CHANGE UP (ref 3.5–5.3)
PROTHROM AB SERPL-ACNC: 12.4 SEC — SIGNIFICANT CHANGE UP (ref 10.5–13.4)
RBC # BLD: 3.83 M/UL — SIGNIFICANT CHANGE UP (ref 3.8–5.2)
RBC # FLD: 14.3 % — SIGNIFICANT CHANGE UP (ref 10.3–14.5)
SODIUM SERPL-SCNC: 137 MMOL/L — SIGNIFICANT CHANGE UP (ref 135–145)
WBC # BLD: 7.15 K/UL — SIGNIFICANT CHANGE UP (ref 3.8–10.5)
WBC # FLD AUTO: 7.15 K/UL — SIGNIFICANT CHANGE UP (ref 3.8–10.5)

## 2023-01-12 PROCEDURE — 83036 HEMOGLOBIN GLYCOSYLATED A1C: CPT

## 2023-01-12 PROCEDURE — 36415 COLL VENOUS BLD VENIPUNCTURE: CPT

## 2023-01-12 PROCEDURE — 82962 GLUCOSE BLOOD TEST: CPT

## 2023-01-12 PROCEDURE — 74240 X-RAY XM UPR GI TRC 1CNTRST: CPT | Mod: 26

## 2023-01-12 PROCEDURE — 85730 THROMBOPLASTIN TIME PARTIAL: CPT

## 2023-01-12 PROCEDURE — 83735 ASSAY OF MAGNESIUM: CPT

## 2023-01-12 PROCEDURE — 84100 ASSAY OF PHOSPHORUS: CPT

## 2023-01-12 PROCEDURE — 85610 PROTHROMBIN TIME: CPT

## 2023-01-12 PROCEDURE — 85025 COMPLETE CBC W/AUTO DIFF WBC: CPT

## 2023-01-12 PROCEDURE — 74240 X-RAY XM UPR GI TRC 1CNTRST: CPT

## 2023-01-12 PROCEDURE — 80048 BASIC METABOLIC PNL TOTAL CA: CPT

## 2023-01-12 RX ORDER — LORATADINE 10 MG/1
10 TABLET ORAL DAILY
Refills: 0 | Status: DISCONTINUED | OUTPATIENT
Start: 2023-01-12 | End: 2023-01-12

## 2023-01-12 RX ADMIN — Medication 400 MILLIGRAM(S): at 00:00

## 2023-01-12 RX ADMIN — Medication 1000 MILLIGRAM(S): at 00:25

## 2023-01-12 RX ADMIN — HEPARIN SODIUM 5000 UNIT(S): 5000 INJECTION INTRAVENOUS; SUBCUTANEOUS at 05:39

## 2023-01-12 RX ADMIN — Medication 15 MILLIGRAM(S): at 06:30

## 2023-01-12 RX ADMIN — Medication 50 MILLIGRAM(S): at 05:38

## 2023-01-12 RX ADMIN — Medication 400 MILLIGRAM(S): at 11:51

## 2023-01-12 RX ADMIN — Medication 1000 MILLIGRAM(S): at 12:20

## 2023-01-12 RX ADMIN — Medication 1 TABLET(S): at 11:48

## 2023-01-12 RX ADMIN — LORATADINE 10 MILLIGRAM(S): 10 TABLET ORAL at 11:49

## 2023-01-12 RX ADMIN — Medication 15 MILLIGRAM(S): at 11:50

## 2023-01-12 RX ADMIN — Medication 400 MILLIGRAM(S): at 05:40

## 2023-01-12 RX ADMIN — Medication 15 MILLIGRAM(S): at 12:30

## 2023-01-12 RX ADMIN — Medication 15 MILLIGRAM(S): at 00:25

## 2023-01-12 RX ADMIN — PANTOPRAZOLE SODIUM 40 MILLIGRAM(S): 20 TABLET, DELAYED RELEASE ORAL at 05:37

## 2023-01-12 RX ADMIN — Medication 1000 MILLIGRAM(S): at 06:30

## 2023-01-12 RX ADMIN — CITALOPRAM 10 MILLIGRAM(S): 10 TABLET, FILM COATED ORAL at 11:48

## 2023-01-12 RX ADMIN — Medication 15 MILLIGRAM(S): at 05:37

## 2023-01-12 RX ADMIN — LOSARTAN POTASSIUM 50 MILLIGRAM(S): 100 TABLET, FILM COATED ORAL at 05:37

## 2023-01-12 NOTE — DISCHARGE NOTE NURSING/CASE MANAGEMENT/SOCIAL WORK - PATIENT PORTAL LINK FT
You can access the FollowMyHealth Patient Portal offered by [facility] by registering at the following website: [email or web address]. By joining [facility]’s FollowMyHealth portal, you will also be able to view your health information using other applications (apps) compatible with our system.

## 2023-01-12 NOTE — PROGRESS NOTE ADULT - ASSESSMENT
Assessment: Patient it a 72 year old female who presented for complaints of dysphagia with PSH of lap sleeve, hiatal hernia repiar, and intra op EGD done on 9/6/22. She is now s/p revision of repair, hernia, hiatal, laparoscopic, lysis of hiatal hernia adhesions . Patient tolerated procedure well and the surgery was uneventful.     Plan  - Full liquid diet in AM, will advance as tolerated  - multi modal pain control   - SCD and lovenox for DVT prophylaxis,   - Encourage use of incentive spirometer.   - Am labs pending  - IV fluids prn   - Upper GI series to be done this morning.

## 2023-01-12 NOTE — DISCHARGE NOTE PROVIDER - NSDCMRMEDTOKEN_GEN_ALL_CORE_FT
acetaminophen 160 mg/5 mL oral suspension: 30 milliliter(s) orally once a day   biotin:   CeleXA 10 mg oral tablet: orally once a day  Iron 100 Plus oral tablet: 1 tab(s) orally once a day  Lopressor 50 mg oral tablet: 1 tab(s) orally every 12 hours  melatonin 10 mg oral capsule: 1 cap(s) orally once a day (at bedtime)  olmesartan 40 mg oral tablet: 1 tab(s) orally once a day  omeprazole 40 mg oral delayed release capsule: 1 opened cap(s) orally once a day   Stool Softener + Stimulant Laxative 50 mg-8.6 mg oral tablet: orally once a day, As Needed  triamterene-hydrochlorothiazide 37.5 mg-25 mg oral tablet: 1 tab(s) orally once a day  Viactiv Soft Calcium Chews oral tablet, chewable: orally once a day  Vitamin B-12:   Vitamin C 500 mg oral tablet: 1 tab(s) orally once a day

## 2023-01-12 NOTE — DISCHARGE NOTE PROVIDER - CARE PROVIDER_API CALL
Rene Cazares)  Surgery  [address] [place] [address], [address] [place] [address]  Phone: [phone]  Fax: [phone]  Follow Up Time:

## 2023-01-12 NOTE — DISCHARGE NOTE PROVIDER - HOSPITAL COURSE
Pt presented to [facility] for elective procedure on 1/11 for CANDELARIO around previous hiatal hernia repair. Pt tolerated procedure well, was extubated and transferred from pacu to floor. Hospital day 1 , pain well controlled, tolerating regular diet, ambulating and voiding independently, hemodynamically normal. stable for discharge home per attending.

## 2023-01-12 NOTE — PROGRESS NOTE ADULT - SUBJECTIVE AND OBJECTIVE BOX
INTERVAL HPI/OVERNIGHT EVENTS:    Patient evaluated at bedside. No acute distress. No acute events overnight. She was resting comfortably in her bed. She denies any nausea and vomiting. She is currently drinking liquids with no issues. Her pain is well controlled. She has no new complaints.     MEDICATIONS  (STANDING):  acetaminophen   IVPB .. 1000 milliGRAM(s) IV Intermittent every 6 hours  citalopram 10 milliGRAM(s) Oral daily  dextrose 5%. 1000 milliLiter(s) (100 mL/Hr) IV Continuous <Continuous>  dextrose 5%. 1000 milliLiter(s) (50 mL/Hr) IV Continuous <Continuous>  dextrose 50% Injectable 25 Gram(s) IV Push once  dextrose 50% Injectable 12.5 Gram(s) IV Push once  dextrose 50% Injectable 25 Gram(s) IV Push once  glucagon  Injectable 1 milliGRAM(s) IntraMuscular once  heparin   Injectable 5000 Unit(s) SubCutaneous every 8 hours  insulin lispro (ADMELOG) corrective regimen sliding scale   SubCutaneous three times a day before meals  ketorolac   Injectable 15 milliGRAM(s) IV Push every 6 hours  melatonin 10 milliGRAM(s) Oral at bedtime  multivitamin/minerals 1 Tablet(s) Oral daily  pantoprazole    Tablet 40 milliGRAM(s) Oral before breakfast  sodium chloride 0.9%. 1000 milliLiter(s) (100 mL/Hr) IV Continuous <Continuous>  triamterene 37.5 mG/hydrochlorothiazide 25 mG Tablet 1 Tablet(s) Oral daily    MEDICATIONS  (PRN):  dextrose Oral Gel 15 Gram(s) Oral once PRN Blood Glucose LESS THAN 70 milliGRAM(s)/deciliter  ketorolac   Injectable 30 milliGRAM(s) IV Push every 6 hours PRN Severe Pain (7 - 10)      Vital Signs Last 24 Hrs  T(C): 36.4 (11 Jan 2023 21:00), Max: 36.8 (11 Jan 2023 14:23)  T(F): 97.6 (11 Jan 2023 21:00), Max: 98.3 (11 Jan 2023 14:23)  HR: 86 (11 Jan 2023 21:00) (62 - 88)  BP: 141/86 (11 Jan 2023 21:00) (103/86 - 141/86)  BP(mean): --  RR: 18 (11 Jan 2023 21:00) (14 - 19)  SpO2: 93% (11 Jan 2023 21:00) (93% - 100%)    Parameters below as of 11 Jan 2023 21:00  Patient On (Oxygen Delivery Method): room air      Physical Exam     Constitutional: NAD  HEENT: PERRLA, EOMI  Respiratory: Breath Sounds equal & clear to auscultation, no accessory muscle use  Cardiovascular: Regular rate & rhythm  Gastrointestinal: Soft, non-tender, Incision clean dry and intact. No drainage. Dermamond in place.     Skin: No rashes      I&O's Detail      LABS:    pending.    RADIOLOGY & ADDITIONAL STUDIES:

## 2023-01-12 NOTE — DISCHARGE NOTE PROVIDER - NSDCCPCAREPLAN_GEN_ALL_CORE_FT
PRINCIPAL DISCHARGE DIAGNOSIS  Diagnosis: Hiatal hernia  Assessment and Plan of Treatment: WOUND CARE: Do not peel off dermabond (purple glue) it will fall off on its own.   BATHING: Please do not submerge wound underwater. You may shower and/or sponge bathe.   ACTIVITY: No heavy lifting or straining. Otherwise, you may return to your usual level of physical activity.   DIET: Return to your usual diet, as tolerated.   NOTIFY YOUR SURGEON IF: You have any bleeding that does not stop, any pus draining from your wound(s), any fever (over 100.4 F) or chills, persistent nausea/vomiting, persistent diarrhea, or if your pain is not controlled.   FOLLOW-UP: Please follow up with Dr. Cazares in 2 weeks regarding your hospitalization. Call for appointment upon discharge.  You may take tylenol and/or motrin for pain.

## 2023-02-06 ENCOUNTER — APPOINTMENT (OUTPATIENT)
Dept: SURGERY | Facility: CLINIC | Age: 73
End: 2023-02-06
Payer: MEDICARE

## 2023-02-06 VITALS
RESPIRATION RATE: 16 BRPM | HEART RATE: 80 BPM | OXYGEN SATURATION: 97 % | HEIGHT: 61 IN | DIASTOLIC BLOOD PRESSURE: 84 MMHG | SYSTOLIC BLOOD PRESSURE: 137 MMHG | BODY MASS INDEX: 47.27 KG/M2 | TEMPERATURE: 97.1 F | WEIGHT: 250.38 LBS

## 2023-02-06 PROCEDURE — 99024 POSTOP FOLLOW-UP VISIT: CPT

## 2023-02-06 NOTE — HISTORY OF PRESENT ILLNESS
[Procedure: ___] : Procedure performed: [unfilled]  [Date of Surgery: ___] : Date of Surgery:   [unfilled] [Surgeon Name:   ___] : Surgeon Name: Dr. MACHUCA [Pre-Op Weight ___] : Pre-op weight was [unfilled] lbs [___ Months Post Op] : [unfilled] months [de-identified] : Presents today for postoperative follow-up status post laparoscopic attempted redo repair of hiatal hernia.  During the surgery we were able to divide several bands of scar tissue that were causing impingement upon the esophagus.  Today, the patient reports that she is feeling better and able to tolerate more food without having dysphagia.

## 2023-02-06 NOTE — ASSESSMENT
[___ Days Post Op] : [unfilled] days [Today's Weight: ___] : Today's weight:[unfilled] lbs [Today's BMI: ___] : Today's BMI: [unfilled] [de-identified] : Doing well s/p lap sleeve gastrectomy, hiatal hernia repair.

## 2023-05-15 ENCOUNTER — APPOINTMENT (OUTPATIENT)
Dept: SURGERY | Facility: CLINIC | Age: 73
End: 2023-05-15
Payer: MEDICARE

## 2023-05-15 VITALS
WEIGHT: 245 LBS | RESPIRATION RATE: 16 BRPM | TEMPERATURE: 98.1 F | DIASTOLIC BLOOD PRESSURE: 74 MMHG | HEART RATE: 63 BPM | HEIGHT: 61 IN | BODY MASS INDEX: 46.26 KG/M2 | SYSTOLIC BLOOD PRESSURE: 134 MMHG | OXYGEN SATURATION: 98 %

## 2023-05-15 PROCEDURE — 99213 OFFICE O/P EST LOW 20 MIN: CPT

## 2023-05-15 NOTE — ASSESSMENT
[FreeTextEntry1] : 71F with morbid obesity, BMI 60, and osteoarthritis in need of bilateral TKA, now s/p sleeve gastrectomy. Having dysphagia despite attempted re-repair of recurrent hiatal hernia. Recommend EGD to assess anatomy.

## 2023-05-15 NOTE — HISTORY OF PRESENT ILLNESS
[de-identified] : 71F with h/o osteoarthritis of the knee, morbid obesity (BMI 60), presents today to discuss her weight loss options. She reports that she needs bilateral knee replacements for OA but that she needs to lose significant weight before she can have surgery. She has limited mobility. She is interested in bariatric surgery as a tool for durable weight loss. She reports that she has tried numerous times to lose weight through more traditional medical means, including caloric restriction, increasing her vegetable intake, and increasing activity.\par Denies GERD.\par No DM\par No tobacco or EtOH use.\par No immunosuppression or chronic NSAID use. [Procedure: ___] : Procedure performed: [unfilled]  [Date of Surgery: ___] : Date of Surgery:   [unfilled] [Surgeon Name:   ___] : Surgeon Name: Dr. MACHUCA [Pre-Op Weight ___] : Pre-op weight was [unfilled] lbs [___ Months Post Op] : [unfilled] months [de-identified] : Presents today for follow-up status post laparoscopic attempted redo repair of hiatal hernia.  During the surgery we were able to divide several bands of scar tissue that were causing impingement upon the esophagus.  Today, the patient reports that she is feeling better and able to tolerate more food without having dysphagia.

## 2023-09-11 ENCOUNTER — APPOINTMENT (OUTPATIENT)
Dept: SURGERY | Facility: CLINIC | Age: 73
End: 2023-09-11
Payer: MEDICARE

## 2023-09-11 VITALS
HEIGHT: 61 IN | HEART RATE: 67 BPM | TEMPERATURE: 98 F | BODY MASS INDEX: 46.44 KG/M2 | SYSTOLIC BLOOD PRESSURE: 183 MMHG | WEIGHT: 246 LBS | RESPIRATION RATE: 16 BRPM | DIASTOLIC BLOOD PRESSURE: 97 MMHG | OXYGEN SATURATION: 96 %

## 2023-09-11 VITALS — DIASTOLIC BLOOD PRESSURE: 82 MMHG | SYSTOLIC BLOOD PRESSURE: 163 MMHG

## 2023-09-11 DIAGNOSIS — R13.10 DYSPHAGIA, UNSPECIFIED: ICD-10-CM

## 2023-09-11 PROCEDURE — 99213 OFFICE O/P EST LOW 20 MIN: CPT

## 2023-12-11 ENCOUNTER — APPOINTMENT (OUTPATIENT)
Dept: SURGERY | Facility: CLINIC | Age: 73
End: 2023-12-11

## 2024-04-22 ENCOUNTER — APPOINTMENT (OUTPATIENT)
Dept: SURGERY | Facility: CLINIC | Age: 74
End: 2024-04-22

## 2024-06-03 ENCOUNTER — APPOINTMENT (OUTPATIENT)
Dept: SURGERY | Facility: CLINIC | Age: 74
End: 2024-06-03
Payer: MEDICARE

## 2024-06-03 VITALS
TEMPERATURE: 98 F | WEIGHT: 252.04 LBS | HEART RATE: 61 BPM | SYSTOLIC BLOOD PRESSURE: 127 MMHG | BODY MASS INDEX: 47.58 KG/M2 | DIASTOLIC BLOOD PRESSURE: 81 MMHG | HEIGHT: 61 IN | RESPIRATION RATE: 16 BRPM | OXYGEN SATURATION: 96 %

## 2024-06-03 DIAGNOSIS — Z90.3 ACQUIRED ABSENCE OF STOMACH [PART OF]: ICD-10-CM

## 2024-06-03 PROCEDURE — 99214 OFFICE O/P EST MOD 30 MIN: CPT

## 2024-08-22 NOTE — DISCHARGE NOTE NURSING/CASE MANAGEMENT/SOCIAL WORK - NSTRANSFERBELONGINGSRESP_GEN_A_NUR
Initiate Treatment: Doxycycline 100mg bid x 10 days. Render In Strict Bullet Format?: No Detail Level: Zone yes

## 2025-02-25 ENCOUNTER — OFFICE (OUTPATIENT)
Dept: URBAN - METROPOLITAN AREA CLINIC 109 | Facility: CLINIC | Age: 75
Setting detail: OPHTHALMOLOGY
End: 2025-02-25
Payer: COMMERCIAL

## 2025-02-25 DIAGNOSIS — H25.13: ICD-10-CM

## 2025-02-25 DIAGNOSIS — G45.3: ICD-10-CM

## 2025-02-25 PROCEDURE — 92250 FUNDUS PHOTOGRAPHY W/I&R: CPT | Performed by: OPHTHALMOLOGY

## 2025-02-25 PROCEDURE — 99204 OFFICE O/P NEW MOD 45 MIN: CPT | Performed by: OPHTHALMOLOGY

## 2025-02-25 ASSESSMENT — REFRACTION_CURRENTRX
OS_SPHERE: +2.00
OD_CYLINDER: -0.50
OD_CYLINDER: -0.75
OS_ADD: +2.25
OS_SPHERE: +1.75
OD_OVR_VA: 20/
OS_CYLINDER: -1.25
OS_ADD: +2.75
OD_SPHERE: +1.50
OD_AXIS: 110
OS_OVR_VA: 20/
OD_AXIS: 015
OD_ADD: +2.25
OD_ADD: +2.75
OS_AXIS: 81
OS_AXIS: 075
OS_CYLINDER: -0.75
OD_OVR_VA: 20/
OD_SPHERE: +2.00
OS_OVR_VA: 20/

## 2025-02-25 ASSESSMENT — REFRACTION_AUTOREFRACTION
OD_CYLINDER: -0.75
OS_CYLINDER: -1.75
OD_SPHERE: +1.75
OS_AXIS: 89
OS_SPHERE: +2.50
OD_AXIS: 112

## 2025-02-25 ASSESSMENT — KERATOMETRY
OS_AXISANGLE_DEGREES: 090
OS_K1POWER_DIOPTERS: 44.75
OD_K1POWER_DIOPTERS: 44.50
OD_AXISANGLE_DEGREES: 090
OD_K2POWER_DIOPTERS: 44.50
OS_K2POWER_DIOPTERS: 44.75

## 2025-02-25 ASSESSMENT — CONFRONTATIONAL VISUAL FIELD TEST (CVF)
OS_FINDINGS: FULL
OD_FINDINGS: FULL

## 2025-02-25 ASSESSMENT — TONOMETRY
OD_IOP_MMHG: 12
OS_IOP_MMHG: 13

## 2025-02-25 ASSESSMENT — VISUAL ACUITY
OS_BCVA: 20/30-1
OD_BCVA: 20/30-

## 2025-04-04 ENCOUNTER — NON-APPOINTMENT (OUTPATIENT)
Age: 75
End: 2025-04-04

## 2025-04-14 ENCOUNTER — OFFICE (OUTPATIENT)
Dept: URBAN - METROPOLITAN AREA CLINIC 109 | Facility: CLINIC | Age: 75
Setting detail: OPHTHALMOLOGY
End: 2025-04-14
Payer: COMMERCIAL

## 2025-04-14 DIAGNOSIS — H25.11: ICD-10-CM

## 2025-04-14 DIAGNOSIS — H25.13: ICD-10-CM

## 2025-04-14 PROCEDURE — 99213 OFFICE O/P EST LOW 20 MIN: CPT | Performed by: OPHTHALMOLOGY

## 2025-04-14 PROCEDURE — 92136 OPHTHALMIC BIOMETRY: CPT | Performed by: OPHTHALMOLOGY

## 2025-04-14 ASSESSMENT — KERATOMETRY
OS_K1POWER_DIOPTERS: 44.50
OS_K2POWER_DIOPTERS: 45.00
OS_AXISANGLE_DEGREES: 131
OD_K2POWER_DIOPTERS: 44.50
OD_K2POWER_DIOPTERS: 44.50
OS_AXISANGLE_DEGREES: 131
OD_AXISANGLE_DEGREES: 20
OS_K1K2_AVERAGE: 44.75
OS_K2POWER_DIOPTERS: 45.00
OD_AXISANGLE2_DEGREES: 20
OS_CYLAXISANGLE_DEGREES: 131
OD_K1POWER_DIOPTERS: 44.25
OD_K1K2_AVERAGE: 44.375
OS_K1POWER_DIOPTERS: 44.50
OD_AXISANGLE_DEGREES: 20
OS_CYLPOWER_DEGREES: 0.5
OD_CYLAXISANGLE_DEGREES: 20
OS_AXISANGLE2_DEGREES: 131
OD_K1POWER_DIOPTERS: 44.25
OD_CYLPOWER_DEGREES: 0.25

## 2025-04-14 ASSESSMENT — REFRACTION_CURRENTRX
OS_SPHERE: +2.00
OS_OVR_VA: 20/
OS_CYLINDER: -0.75
OS_ADD: +2.25
OD_ADD: +2.75
OS_CYLINDER: -1.25
OS_ADD: +2.75
OD_CYLINDER: -0.75
OS_AXIS: 81
OD_OVR_VA: 20/
OD_SPHERE: +2.00
OD_CYLINDER: -0.50
OS_SPHERE: +1.75
OS_OVR_VA: 20/
OD_AXIS: 015
OD_ADD: +2.25
OD_OVR_VA: 20/
OD_SPHERE: +1.50
OS_AXIS: 075
OD_AXIS: 110

## 2025-04-14 ASSESSMENT — CONFRONTATIONAL VISUAL FIELD TEST (CVF)
OS_FINDINGS: FULL
OD_FINDINGS: FULL

## 2025-04-14 ASSESSMENT — REFRACTION_MANIFEST
OD_CYLINDER: -0.50
OD_SPHERE: +2.00
OD_AXIS: 100

## 2025-04-14 ASSESSMENT — VISUAL ACUITY
OD_BCVA: 20/30-
OS_BCVA: 20/30-1

## 2025-04-14 ASSESSMENT — REFRACTION_AUTOREFRACTION
OD_SPHERE: +2.00
OD_CYLINDER: -0.50
OS_CYLINDER: -1.50
OD_AXIS: 106
OS_SPHERE: +2.25
OS_AXIS: 085

## 2025-04-14 ASSESSMENT — TONOMETRY
OS_IOP_MMHG: 11
OD_IOP_MMHG: 13

## 2025-04-24 ENCOUNTER — NON-APPOINTMENT (OUTPATIENT)
Age: 75
End: 2025-04-24

## 2025-04-29 ENCOUNTER — AMBULATORY SURGERY CENTER (OUTPATIENT)
Dept: URBAN - METROPOLITAN AREA SURGERY 19 | Facility: SURGERY | Age: 75
Setting detail: OPHTHALMOLOGY
End: 2025-04-29
Payer: COMMERCIAL

## 2025-04-29 DIAGNOSIS — H25.11: ICD-10-CM

## 2025-04-29 PROCEDURE — 66984 XCAPSL CTRC RMVL W/O ECP: CPT | Mod: RT | Performed by: OPHTHALMOLOGY

## 2025-04-30 ENCOUNTER — OFFICE (OUTPATIENT)
Dept: URBAN - METROPOLITAN AREA CLINIC 109 | Facility: CLINIC | Age: 75
Setting detail: OPHTHALMOLOGY
End: 2025-04-30
Payer: COMMERCIAL

## 2025-04-30 DIAGNOSIS — H25.12: ICD-10-CM

## 2025-04-30 DIAGNOSIS — Z96.1: ICD-10-CM

## 2025-04-30 PROCEDURE — 99024 POSTOP FOLLOW-UP VISIT: CPT | Performed by: OPTOMETRIST

## 2025-04-30 ASSESSMENT — REFRACTION_CURRENTRX
OD_SPHERE: +2.00
OD_OVR_VA: 20/
OS_OVR_VA: 20/
OD_OVR_VA: 20/
OD_CYLINDER: -0.75
OD_ADD: +2.25
OS_AXIS: 075
OS_ADD: +2.25
OD_CYLINDER: -0.50
OS_ADD: +2.75
OS_CYLINDER: -0.75
OD_ADD: +2.75
OS_CYLINDER: -1.25
OD_AXIS: 015
OS_SPHERE: +2.00
OS_OVR_VA: 20/
OD_SPHERE: +1.50
OD_AXIS: 110
OS_AXIS: 81
OS_SPHERE: +1.75

## 2025-04-30 ASSESSMENT — CONFRONTATIONAL VISUAL FIELD TEST (CVF)
OD_FINDINGS: FULL
OS_FINDINGS: FULL

## 2025-04-30 ASSESSMENT — REFRACTION_MANIFEST
OD_AXIS: 100
OD_SPHERE: +2.00
OD_CYLINDER: -0.50

## 2025-04-30 ASSESSMENT — TONOMETRY
OS_IOP_MMHG: 12
OD_IOP_MMHG: 17

## 2025-04-30 ASSESSMENT — VISUAL ACUITY
OS_BCVA: 20/25-1
OD_BCVA: 20/30-

## 2025-05-07 ENCOUNTER — OFFICE (OUTPATIENT)
Dept: URBAN - METROPOLITAN AREA CLINIC 109 | Facility: CLINIC | Age: 75
Setting detail: OPHTHALMOLOGY
End: 2025-05-07
Payer: COMMERCIAL

## 2025-05-07 DIAGNOSIS — H25.12: ICD-10-CM

## 2025-05-07 DIAGNOSIS — Z96.1: ICD-10-CM

## 2025-05-07 PROCEDURE — 99024 POSTOP FOLLOW-UP VISIT: CPT | Performed by: OPHTHALMOLOGY

## 2025-05-07 PROCEDURE — 92136 OPHTHALMIC BIOMETRY: CPT | Performed by: OPHTHALMOLOGY

## 2025-05-07 ASSESSMENT — REFRACTION_CURRENTRX
OD_SPHERE: +1.50
OD_OVR_VA: 20/
OD_CYLINDER: -0.50
OD_AXIS: 110
OS_AXIS: 81
OD_ADD: +2.75
OS_CYLINDER: -0.75
OD_SPHERE: +2.00
OS_AXIS: 075
OD_CYLINDER: -0.75
OS_SPHERE: +2.00
OD_OVR_VA: 20/
OS_ADD: +2.25
OS_ADD: +2.75
OD_ADD: +2.25
OS_SPHERE: +1.75
OS_OVR_VA: 20/
OD_AXIS: 015
OS_CYLINDER: -1.25
OS_OVR_VA: 20/

## 2025-05-07 ASSESSMENT — KERATOMETRY
OS_K2POWER_DIOPTERS: 44.75
OD_K1POWER_DIOPTERS: 44.5
OS_AXISANGLE_DEGREES: 145
OD_K2POWER_DIOPTERS: 44.75
OS_K1POWER_DIOPTERS: 44.50
OD_AXISANGLE_DEGREES: 035

## 2025-05-07 ASSESSMENT — REFRACTION_MANIFEST
OD_SPHERE: +2.00
OS_VA1: 20/NI
OS_AXIS: 090
OS_SPHERE: +2.50
OS_CYLINDER: -1.75
OD_AXIS: 100
OD_CYLINDER: -0.50

## 2025-05-07 ASSESSMENT — REFRACTION_AUTOREFRACTION
OD_CYLINDER: -0.75
OS_SPHERE: +2.50
OD_SPHERE: 0.00
OD_AXIS: 108
OS_AXIS: 88
OS_CYLINDER: -1.75

## 2025-05-07 ASSESSMENT — TONOMETRY
OS_IOP_MMHG: 12
OD_IOP_MMHG: 13

## 2025-05-07 ASSESSMENT — VISUAL ACUITY
OD_BCVA: 20/50
OS_BCVA: 20/25-1

## 2025-05-07 ASSESSMENT — CONFRONTATIONAL VISUAL FIELD TEST (CVF)
OD_FINDINGS: FULL
OS_FINDINGS: FULL

## 2025-05-13 ENCOUNTER — AMBULATORY SURGERY CENTER (OUTPATIENT)
Dept: URBAN - METROPOLITAN AREA SURGERY 19 | Facility: SURGERY | Age: 75
Setting detail: OPHTHALMOLOGY
End: 2025-05-13
Payer: COMMERCIAL

## 2025-05-13 DIAGNOSIS — H25.12: ICD-10-CM

## 2025-05-13 PROCEDURE — 66984 XCAPSL CTRC RMVL W/O ECP: CPT | Mod: 79,LT | Performed by: OPHTHALMOLOGY

## 2025-05-14 ENCOUNTER — OFFICE (OUTPATIENT)
Dept: URBAN - METROPOLITAN AREA CLINIC 109 | Facility: CLINIC | Age: 75
Setting detail: OPHTHALMOLOGY
End: 2025-05-14
Payer: COMMERCIAL

## 2025-05-14 ENCOUNTER — RX ONLY (RX ONLY)
Age: 75
End: 2025-05-14

## 2025-05-14 DIAGNOSIS — Z96.1: ICD-10-CM

## 2025-05-14 PROCEDURE — 99024 POSTOP FOLLOW-UP VISIT: CPT | Performed by: OPTOMETRIST

## 2025-05-14 ASSESSMENT — REFRACTION_MANIFEST
OS_AXIS: 090
OD_CYLINDER: -0.50
OS_VA1: 20/NI
OS_CYLINDER: -1.75
OS_SPHERE: +2.50
OD_AXIS: 100
OD_SPHERE: +2.00

## 2025-05-14 ASSESSMENT — REFRACTION_CURRENTRX
OD_CYLINDER: -0.50
OS_ADD: +2.75
OS_ADD: +2.25
OD_ADD: +2.75
OS_SPHERE: +2.00
OD_AXIS: 015
OD_ADD: +2.25
OD_SPHERE: +2.00
OS_AXIS: 81
OS_AXIS: 075
OS_CYLINDER: -1.25
OD_AXIS: 110
OS_SPHERE: +1.75
OD_SPHERE: +1.50
OS_OVR_VA: 20/
OS_CYLINDER: -0.75
OD_CYLINDER: -0.75
OD_OVR_VA: 20/
OS_OVR_VA: 20/
OD_OVR_VA: 20/

## 2025-05-14 ASSESSMENT — KERATOMETRY
OS_K2POWER_DIOPTERS: 44.75
OD_AXISANGLE_DEGREES: 035
OS_K1POWER_DIOPTERS: 44.50
OD_K1POWER_DIOPTERS: 44.5
OD_K2POWER_DIOPTERS: 44.75
OS_AXISANGLE_DEGREES: 145

## 2025-05-14 ASSESSMENT — REFRACTION_AUTOREFRACTION
OS_SPHERE: +2.50
OD_SPHERE: 0.00
OS_AXIS: 88
OD_AXIS: 108
OS_CYLINDER: -1.75
OD_CYLINDER: -0.75

## 2025-05-14 ASSESSMENT — TONOMETRY
OD_IOP_MMHG: 13
OS_IOP_MMHG: 11

## 2025-05-14 ASSESSMENT — CONFRONTATIONAL VISUAL FIELD TEST (CVF)
OS_FINDINGS: FULL
OD_FINDINGS: FULL

## 2025-05-14 ASSESSMENT — VISUAL ACUITY
OD_BCVA: 20/30+2
OS_BCVA: 20/25-1

## 2025-06-03 ENCOUNTER — OFFICE (OUTPATIENT)
Dept: URBAN - METROPOLITAN AREA CLINIC 109 | Facility: CLINIC | Age: 75
Setting detail: OPHTHALMOLOGY
End: 2025-06-03
Payer: COMMERCIAL

## 2025-06-03 DIAGNOSIS — Z96.1: ICD-10-CM

## 2025-06-03 PROCEDURE — 99024 POSTOP FOLLOW-UP VISIT: CPT | Performed by: OPTOMETRIST

## 2025-06-03 ASSESSMENT — REFRACTION_CURRENTRX
OS_SPHERE: +2.00
OS_AXIS: 81
OD_AXIS: 110
OD_ADD: +2.25
OD_SPHERE: +1.50
OD_CYLINDER: -0.50
OS_SPHERE: +1.75
OS_OVR_VA: 20/
OD_OVR_VA: 20/
OD_AXIS: 015
OS_OVR_VA: 20/
OD_SPHERE: +2.00
OD_OVR_VA: 20/
OS_ADD: +2.25
OD_ADD: +2.75
OS_CYLINDER: -0.75
OS_AXIS: 075
OD_CYLINDER: -0.75
OS_CYLINDER: -1.25
OS_ADD: +2.75

## 2025-06-03 ASSESSMENT — REFRACTION_MANIFEST
OS_SPHERE: +2.50
OS_ADD: +2.00
OD_ADD: +2.00
OD_SPHERE: PLANO
OD_SPHERE: +2.00
OD_CYLINDER: -0.50
OS_VA1: 20/NI
OS_CYLINDER: -1.75
OD_AXIS: 100
OS_AXIS: 090
OS_SPHERE: PLANO

## 2025-06-03 ASSESSMENT — KERATOMETRY
OS_K2POWER_DIOPTERS: 44.75
OS_AXISANGLE_DEGREES: 145
OD_K1POWER_DIOPTERS: 44.5
OD_AXISANGLE_DEGREES: 035
OS_K1POWER_DIOPTERS: 44.50
OD_K2POWER_DIOPTERS: 44.75

## 2025-06-03 ASSESSMENT — VISUAL ACUITY
OD_BCVA: 20/20
OS_BCVA: 20/20

## 2025-06-03 ASSESSMENT — REFRACTION_AUTOREFRACTION
OS_CYLINDER: -0.75
OD_AXIS: 103
OS_SPHERE: +0.25
OD_CYLINDER: -0.75
OD_SPHERE: 0.00
OS_AXIS: 79

## (undated) DEVICE — TUBING INSUFFLATION LAP FILTER 10FT

## (undated) DEVICE — VISIGI 3D SLEEVE GASTRECTOMY 36FR

## (undated) DEVICE — DRAPE HALF SHEET 40X57"

## (undated) DEVICE — XI STAPLER SUREFORM 60

## (undated) DEVICE — BLADE SURGICAL #15 CARBON

## (undated) DEVICE — ELCTR CORD FOOTSWITCH 1PLR LAPSCP 10FT

## (undated) DEVICE — SUT VICRYL 0 27" UR-6

## (undated) DEVICE — TROCAR COVIDIEN VERSAPORT BLADELESS OPTICAL 5MM STANDARD

## (undated) DEVICE — ENDO SHEARS COVIDIEN 5MM LONG W MONOPOLAR CAUTERY

## (undated) DEVICE — FOLEY TRAY 16FR 5CC LF LUBRISIL ADVANCE TEMP CLOSED

## (undated) DEVICE — DRSG STERISTRIPS 0.5X4"

## (undated) DEVICE — ENDOPATH 5MM CVD SCISSOR W MONOPOLAR CAUTERY DISP

## (undated) DEVICE — TROCAR COVIDIEN VERSAPORT BLADELESS OPTICAL 12MM STANDARD

## (undated) DEVICE — TROCAR COVIDIEN VERSAONE FIXATION CANNULA 5MM

## (undated) DEVICE — VENODYNE/SCD SLEEVE CALF BARIATRIC

## (undated) DEVICE — PREP CHLORAPREP HI-LITE ORANGE 26ML

## (undated) DEVICE — CONTAINER SPECIMEN 100ML

## (undated) DEVICE — TUBING HYBRID CO2

## (undated) DEVICE — TUBING IRRIGATION DAVOL SYSTEM X STREAM

## (undated) DEVICE — POSITIONER FOAM EGG CRATE ULNAR (2PCS)

## (undated) DEVICE — PACK ADVANCED LAPAROSCOPIC NS

## (undated) DEVICE — SUT ENDOSTITCH DEVICE 10MM

## (undated) DEVICE — ENDOCATCH II 15MM

## (undated) DEVICE — POSITIONER FOAM EGG CRATE ULNAR 2PCS (PINK)

## (undated) DEVICE — DRAPE INSTRUMENT POUCH

## (undated) DEVICE — D HELP - CLEARVIEW CLEARIFY SYSTEM

## (undated) DEVICE — IRRIGATION TRAY W PISTON SYRINGE 60ML

## (undated) DEVICE — SOL IRR POUR H2O 250ML

## (undated) DEVICE — MEDICATION LABELS W MARKER

## (undated) DEVICE — DRAPE TOWEL BLUE 17" X 24"

## (undated) DEVICE — NDL COVIDIEN 14G INSUFFLATION NEEDLE

## (undated) DEVICE — TROCAR COVIDIEN VERSASTEP PLUS 15MM

## (undated) DEVICE — LIGASURE MARYLAND 37CM

## (undated) DEVICE — WARMING BLANKET UPPER ADULT

## (undated) DEVICE — Device

## (undated) DEVICE — SUT MONOCRYL 4-0 27" PS-2 UNDYED

## (undated) DEVICE — SOL IRR POUR NS 0.9% 500ML

## (undated) DEVICE — TUBING STRYKEFLOW II SUCTION / IRRIGATOR

## (undated) DEVICE — GOWN XL EXTRA LONG

## (undated) DEVICE — DRSG STERISTRIPS 0.5 X 4"

## (undated) DEVICE — TROCAR COVIDIEN VERSASTEP PLUS 12MM W SLEEVE

## (undated) DEVICE — VISIGI 3D SLEEVE GASTRECTOMY 40FR

## (undated) DEVICE — SUT POLYSORB 0 30" GS-23

## (undated) DEVICE — BLANKET WARMER UPPER ADULT

## (undated) DEVICE — TAPE SILK 3"

## (undated) DEVICE — MARKER SKIN MULTI TIP 6"

## (undated) DEVICE — SUT POLYSORB 3-0 30" V-20 UNDYED

## (undated) DEVICE — DRSG DERMABOND 0.7ML

## (undated) DEVICE — DRSG MASTISOL

## (undated) DEVICE — WRAP COMPRESSION CALF LG

## (undated) DEVICE — PREP CHLORAPREP ORANGE 2PCT 26ML

## (undated) DEVICE — GOWN TRIMAX LG

## (undated) DEVICE — DRAPE MAYO STAND 30"

## (undated) DEVICE — PACK GENERAL LAPAROSCOPY

## (undated) DEVICE — SUT ETHIBOND 0 44" EN3

## (undated) DEVICE — LIGASURE MARYLAND JAW LAPAROSCOPIC SEALER 5MM-44CM

## (undated) DEVICE — GLV 8 PROTEXIS

## (undated) DEVICE — INSUFFLATION NDL COVIDIEN SURGINEEDLE VERESS 120MM

## (undated) DEVICE — SUT BIOSYN 4-0 18" P-12

## (undated) DEVICE — SUT ENDOSTITCH SURGIDAC 0 48" GREEN